# Patient Record
Sex: FEMALE | Race: OTHER | HISPANIC OR LATINO | Employment: FULL TIME | URBAN - METROPOLITAN AREA
[De-identification: names, ages, dates, MRNs, and addresses within clinical notes are randomized per-mention and may not be internally consistent; named-entity substitution may affect disease eponyms.]

---

## 2017-05-02 ENCOUNTER — OFFICE VISIT (OUTPATIENT)
Dept: PODIATRY | Facility: CLINIC | Age: 42
End: 2017-05-02
Payer: COMMERCIAL

## 2017-05-02 VITALS
SYSTOLIC BLOOD PRESSURE: 104 MMHG | HEIGHT: 66 IN | DIASTOLIC BLOOD PRESSURE: 61 MMHG | WEIGHT: 207 LBS | HEART RATE: 65 BPM | RESPIRATION RATE: 18 BRPM | BODY MASS INDEX: 33.27 KG/M2

## 2017-05-02 DIAGNOSIS — M79.672 BILATERAL FOOT PAIN: Primary | ICD-10-CM

## 2017-05-02 DIAGNOSIS — M79.671 BILATERAL FOOT PAIN: Primary | ICD-10-CM

## 2017-05-02 PROCEDURE — 1160F RVW MEDS BY RX/DR IN RCRD: CPT | Mod: S$GLB,,, | Performed by: PODIATRIST

## 2017-05-02 PROCEDURE — 99203 OFFICE O/P NEW LOW 30 MIN: CPT | Mod: S$GLB,,, | Performed by: PODIATRIST

## 2017-05-02 RX ORDER — VALACYCLOVIR HYDROCHLORIDE 500 MG/1
TABLET, FILM COATED ORAL
COMMUNITY
Start: 2017-02-08

## 2017-05-02 RX ORDER — LEVOTHYROXINE SODIUM 125 UG/1
TABLET ORAL
COMMUNITY
Start: 2017-04-24

## 2017-05-02 NOTE — PROGRESS NOTES
Subjective:    Patient ID: Jane Carrasquillo is a 42 y.o. female.    Chief Complaint: Toe Pain (bilateral feet, mos pain on left foot)      HPI:   Jane is a 42 y.o. female who presents to the podiatry clinic  with complaint of  bilateral foot pain. Onset of the symptoms was several years ago.  Pt had severe bunions b/l. The patient has numerous pxs perfomed by dr. Evelina maldonado. She had primary surgery in November(what appears to be L foot scarf+2nd weil) and December(what appears to be R foot base wedge) of 2016. Both operations were complicated by delayed healing, jessee the L foot. Eventually the patient developed postop pain that was thought to be a result of painful hardware. The patient was brought back to the OR for hardware removal from bunion surgery b/l. The patient reports after the TRUMAN operations, the patient still had continued pain and swelling. Her PCP ended up checking her vitamin D levels and they came back very low. She reports her deformity has recurred and she is still in pain L>R. Reports pain and swelling that keep her daily activities. Is not sure if her bone is healed yet. Here for evaluation.    HPI    Last Podiatry Enc: Visit date not found  Last Enc w/ Me: Visit date not found    Past Medical History:   Diagnosis Date    Asthma     childhood    Thyroid disease      Past Surgical History:   Procedure Laterality Date    BUNIONECTOMY Left      Social History     Social History    Marital status: Single     Spouse name: N/A    Number of children: N/A    Years of education: N/A     Occupational History    Not on file.     Social History Main Topics    Smoking status: Never Smoker    Smokeless tobacco: Not on file    Alcohol use Yes      Comment: occasional    Drug use: Not on file    Sexual activity: Not on file     Other Topics Concern    Not on file     Social History Narrative         Current Outpatient Prescriptions:     levothyroxine (SYNTHROID) 125 MCG tablet, , Disp: , Rfl:  "    oxycodone-acetaminophen (PERCOCET) 7.5-325 mg per tablet, Take 1 tablet by mouth every 4 (four) hours as needed for Pain., Disp: , Rfl:     valacyclovir (VALTREX) 500 MG tablet, , Disp: , Rfl:   Review of patient's allergies indicates:  No Known Allergies    ROS  ROS Constitutional:  General Appearance: well nourished  Vascular: negative for cramps, edema and bruising  Musculoskeletal: positive for joint pain, joint edema  Skin: negative for rashes and lesions  Neurological: negative for burning, tingling and numbness  Gastrointestinal: negative for stomach pain, nausea and vomiting        Objective:        /61 (BP Location: Right arm, Patient Position: Sitting, BP Method: Automatic)  Pulse 65  Resp 18  Ht 5' 6" (1.676 m)  Wt 93.9 kg (207 lb)  BMI 33.41 kg/m2    General    Nursing note and vitals reviewed.  Constitutional: She is oriented to person, place, and time. She appears well-developed.   Neurological: She is alert and oriented to person, place, and time.   Psychiatric: Judgment and thought content normal.         Right Ankle/Foot Exam     Swelling   The patient is swollen on the great toe metatarsophalangeal joint.    Tenderness   The patient is tender to palpation of the great toe metatarsophalangeal joint.    Pain   The patient exhibits pain of the great toe metatarsophalangeal joint.    Range of Motion   Ankle Joint   Dorsiflexion: abnormal   Plantar flexion: normal   Subtalar Joint   Inversion: normal   Eversion: normal   First MTP Joint: painful and limited    Muscle Strength   The patient has normal right ankle strength.    Other   Sensation: normal    Left Ankle/Foot Exam     Pain   The patient exhibits pain of the great toe metatarsophalangeal joint and lesser metatarsophalangeal joints.    Swelling   The patient is swollen on the great toe metatarsophalangeal joint and lesser metatarsophalangeal joints.    Tenderness   The patient is tender to palpation of the great toe " metatarsophalangeal joint and lesser metatarsophalangeal joints.    Range of Motion   Ankle Joint  Dorsiflexion: abnormal   Plantar flexion: normal     Subtalar Joint   Inversion: normal   Eversion: normal   First MTP Joint: painful and limited    Muscle Strength   The patient has normal left ankle strength.    Other   Sensation: normal      Vascular Exam     Right Pulses  Dorsalis Pedis:      2+  Posterior Tibial:      2+        Left Pulses  Dorsalis Pedis:      2+  Posterior Tibial:      2+          Physical Exam   Constitutional: She is oriented to person, place, and time. She appears well-developed.   Cardiovascular:   Pulses:       Dorsalis pedis pulses are 2+ on the right side, and 2+ on the left side.        Posterior tibial pulses are 2+ on the right side, and 2+ on the left side.   Neurological: She is alert and oriented to person, place, and time.   Psychiatric: Judgment and thought content normal.   Nursing note and vitals reviewed.    LE exam con't:  V: DP 2/4, PT 2/4, CRT< 3s to all digits tested.    N: SILT in SP/DP/T/Camron/Saph distributions.    Derm: Skin intact. No erythema, edema or ecchymosis.     Ortho:  +Motor EHL/FHL/TA/GA   B/l bunion with 2nd HT deformity present   Limited ROM at 1st mtpj b/l   +POP L sub 2nd mtpj   +POP b/l TMTJ   Compartments soft/compressible. No pain on passive stretch of big toe. No calf  pain.        Assessment:     Imaging / Labs:                                1. Bilateral foot pain          Plan:       Orders Placed This Encounter    X-Ray Foot Complete Bilateral    X-Ray Calcaneus Bilateral    X-Ray Ankle Complete Bilateral    Vitamin B12 Deficiency Panel     Procedures  .   Jane was seen today for toe pain.    Diagnoses and all orders for this visit:    Bilateral foot pain  -     X-Ray Foot Complete Bilateral; Future  -     X-Ray Calcaneus Bilateral; Future  -     X-Ray Ankle Complete Bilateral; Future  -     Vitamin B12 Deficiency Panel; Future        Jane G  Foreign is a 42 y.o. female presenting w/ 1. Bilateral foot pain        -pt seen, evaluated, and managed  -dx discussed in detail. All questions/concerns addressed  -all tx options discussed. All alternatives, risks, benefits of all txs discussed  -The patient was educated regarding the above diagnosis. We discussed conservative care options regarding shoe wear and/or padding.  -rxs dispensed: none  -xr on way out--> will review at nxt visit  -continue icing/stretching regimen  -offloading pads dispensed  -rec'd shoegear changes and OTC orthotics    Return in about 8 weeks (around 6/27/2017).

## 2017-05-02 NOTE — MR AVS SNAPSHOT
[facility]  [address], [address]   [place] [address]  Phone: [phone]  Fax: [phone]                  Jane Carrasquillo   2017 1:15 PM   Office Visit    Description:  Female : 1975   Provider:  Nelson Hill Jr., DPM   Department:  [facility]           Reason for Visit     Toe Pain           Diagnoses this Visit        Comments    Bilateral foot pain    -  Primary            To Do List           Future Appointments        Provider Department Dept Phone    2017 3:00 PM Nelson Hill Jr., DPM [facility] [phone]      Goals (5 Years of Data)     None      Follow-Up and Disposition     Return in about 8 weeks (around 2017).      [facility] On Call     [facility] On Call Nurse Care Line -  Assistance  Unless otherwise directed by your provider, please contact [facility] On-Call, our nurse care line that is available for  assistance.     Registered nurses in the [facility] provide: appointment scheduling, clinical advisement, health education, and other advisory services.  Call: [phone] (toll free)               Medications           STOP taking these medications     thyroid 30 mg Tab Take 30 mg by mouth once daily.    tramadol (ULTRAM) 50 mg tablet Take 50 mg by mouth every 6 (six) hours as needed for Pain.           Verify that the below list of medications is an accurate representation of the medications you are currently taking.  If none reported, the list may be blank. If incorrect, please contact your healthcare provider. Carry this list with you in case of emergency.           Current Medications     levothyroxine (SYNTHROID) 125 MCG tablet     oxycodone-acetaminophen (PERCOCET) 7.5-325 mg per tablet Take 1 tablet by mouth every 4 (four) hours as needed for Pain.    valacyclovir (VALTREX) 500 MG tablet            Clinical Reference Information           Your Vitals Were     BP Pulse Resp  "Height Weight BMI    104/61 (BP Location: Right arm, Patient Position: Sitting, BP Method: Automatic) 65 18 5' 6" (1.676 m) 93.9 kg (207 lb) 33.41 kg/m2      Blood Pressure          Most Recent Value    BP  104/61      Allergies as of 5/2/2017     No Known Allergies      Immunizations Administered on Date of Encounter - 5/2/2017     None      Orders Placed During Today's Visit     Future Labs/Procedures Expected by Expires    X-Ray Ankle Complete Bilateral  5/2/2017 5/2/2018    X-Ray Calcaneus Bilateral  5/2/2017 5/2/2018    X-Ray Foot Complete Bilateral  5/2/2017 5/2/2018      [facility] Sign-Up     Activating your [facility] account is as easy as 1-2-3!     1) Visit [email or web address], select Sign Up Now, enter this activation code and your date of birth, then select Next.  [email or web address]  Expires: 6/16/2017  2:19 PM      2) Create a username and password to use when you visit [facility] in the future and select a security question in case you lose your password and select Next.    3) Enter your e-mail address and click Sign Up!    Additional Information  If you have questions, please e-mail [email or web address] or call [phone] to talk to our [facility] staff. Remember, [facility] is NOT to be used for urgent needs. For medical emergencies, dial 911.         Language Assistance Services     ATTENTION: Language assistance services are available, free of charge. Please call [phone].      ATENCIÓN: Si habla español, tiene a coyne disposición servicios gratuitos de asistencia lingüística. Llame al [phone].     CHÚ Ý: N?u b?n nói Ti?ng Vi?t, có các d?ch v? h? tr? ngôn ng? mi?n phí dành cho b?n. G?i s? [phone].         [facility] complies with applicable Federal civil rights laws and does not discriminate on the basis of race, color, national origin, age, disability, or sex.        "

## 2017-06-27 ENCOUNTER — OFFICE VISIT (OUTPATIENT)
Dept: PODIATRY | Facility: CLINIC | Age: 42
End: 2017-06-27
Payer: COMMERCIAL

## 2017-06-27 VITALS
WEIGHT: 203 LBS | SYSTOLIC BLOOD PRESSURE: 103 MMHG | BODY MASS INDEX: 32.62 KG/M2 | HEART RATE: 59 BPM | HEIGHT: 66 IN | DIASTOLIC BLOOD PRESSURE: 62 MMHG

## 2017-06-27 DIAGNOSIS — M20.22 HALLUX RIGIDUS OF LEFT FOOT: Primary | ICD-10-CM

## 2017-06-27 PROCEDURE — 99213 OFFICE O/P EST LOW 20 MIN: CPT | Mod: S$GLB,,, | Performed by: PODIATRIST

## 2017-06-27 RX ORDER — OMEPRAZOLE 10 MG/1
10 CAPSULE, DELAYED RELEASE ORAL DAILY
COMMUNITY

## 2017-06-27 NOTE — PROGRESS NOTES
Subjective:    Patient ID: Jane Carrasquillo is a 42 y.o. female.    Chief Complaint: Follow-up      HPI:   Jane is a 42 y.o. female who presents to the podiatry clinic  with complaint of  bilateral foot pain. Onset of the symptoms was several years ago.  Pt had severe bunions b/l. The patient has numerous pxs perfomed by dr. Evelina maldonado. She had primary surgery in November(what appears to be L foot scarf+2nd weil) and December(what appears to be R foot base wedge) of 2016. Both operations were complicated by delayed healing, jessee the L foot. Eventually the patient developed postop pain that was thought to be a result of painful hardware. The patient was brought back to the OR for hardware removal from bunion surgery b/l. The patient reports after the TRUMAN operations, the patient still had continued pain and swelling. Her PCP ended up checking her vitamin D levels and they came back very low. She reports her deformity has recurred and she is still in pain L>R. Reports pain and swelling that keep her daily activities. Is not sure if her bone is healed yet. Here for evaluation.    HPI    Last Podiatry Enc: 5/2/2017  Last Enc w/ Me: 5/2/2017    Past Medical History:   Diagnosis Date    Asthma     childhood    Thyroid disease      Past Surgical History:   Procedure Laterality Date    BUNIONECTOMY Left      Social History     Social History    Marital status: Single     Spouse name: N/A    Number of children: N/A    Years of education: N/A     Occupational History    Not on file.     Social History Main Topics    Smoking status: Never Smoker    Smokeless tobacco: Not on file    Alcohol use Yes      Comment: occasional    Drug use: Unknown    Sexual activity: Not on file     Other Topics Concern    Not on file     Social History Narrative    No narrative on file         Current Outpatient Prescriptions:     levothyroxine (SYNTHROID) 125 MCG tablet, , Disp: , Rfl:     omeprazole (PRILOSEC) 10 MG capsule,  "Take 10 mg by mouth once daily., Disp: , Rfl:     oxycodone-acetaminophen (PERCOCET) 7.5-325 mg per tablet, Take 1 tablet by mouth every 4 (four) hours as needed for Pain., Disp: , Rfl:     valacyclovir (VALTREX) 500 MG tablet, , Disp: , Rfl:   Review of patient's allergies indicates:  No Known Allergies    ROS  ROS Constitutional:  General Appearance: well nourished  Vascular: negative for cramps, edema and bruising  Musculoskeletal: positive for joint pain, joint edema  Skin: negative for rashes and lesions  Neurological: negative for burning, tingling and numbness  Gastrointestinal: negative for stomach pain, nausea and vomiting      Objective:        /62   Pulse (!) 59   Ht 5' 6" (1.676 m)   Wt 92.1 kg (203 lb)   BMI 32.77 kg/m²     General    Nursing note and vitals reviewed.  Constitutional: She appears well-developed.   Psychiatric: She has a normal mood and affect. Her behavior is normal.         Left Ankle/Foot Exam     Pain   The patient exhibits pain of the great toe metatarsophalangeal joint and lesser metatarsophalangeal joints.    Swelling   The patient is swollen on the great toe metatarsophalangeal joint.    Tenderness   The patient is tender to palpation of the great toe metatarsophalangeal joint and lesser metatarsophalangeal joints.    Range of Motion   Ankle Joint  Dorsiflexion: abnormal   Plantar flexion: normal     Subtalar Joint   Inversion: normal   Eversion: normal   First MTP Joint: painful and limited    Other   Sensation: normal      Muscle Strength   Right Lower Extremity   EHL:  5/5  Left Lower Extremity   Anterior tibial:  5/5/5   Posterior tibial:  5/5/5  Gastrocsoleus:  5/5/5  Peroneal muscle:  5/5/5  FDL: 5/5  FHL: 2/5    Vascular Exam       Left Pulses  Dorsalis Pedis:      2+  Posterior Tibial:      2+          Physical Exam   Constitutional: She appears well-developed.   Cardiovascular:   Pulses:       Dorsalis pedis pulses are 2+ on the left side.        Posterior " tibial pulses are 2+ on the left side.   Psychiatric: She has a normal mood and affect. Her behavior is normal.   Nursing note and vitals reviewed.    LE exam con't:  V: DP 2/4, PT 2/4, CRT< 3s to all digits tested.    N: SILT in SP/DP/T/Camron/Saph distributions.    Derm: Skin intact. No erythema, edema or ecchymosis.     Ortho:  +Motor EHL/FHL/TA/GA   B/l bunion with 2nd HT deformity present b/l   Limited ROM and POP at 1st mtpj b/l (L>R), pain jessee sub MT head 1   +POP L sub 2nd mtpj b/l (L>R)   Compartments soft/compressible. No pain on passive stretch of big toe. No calf  pain.        Assessment:     Imaging / Labs:  Also reviewed previous XRs (in initial note)    Narrative     Foot complete 3 views bilateral.    Findings: 6 views. There is osseous spur at the Achilles insertion on the calcaneus bilaterally. There is a remote healed deformity of the mid to distal first metatarsal bilaterally. There is a single fixation screw identified within the distal second metatarsal on the left with no evidence of hardware failure. There is no acute fracture or dislocation.   Impression      As above.      Electronically signed by: CARLOS FELIZ MD  Date: 05/02/17  Time: 15:50            1. Hallux rigidus of left foot          Plan:       Orders Placed This Encounter    ORTHOTIC DEVICE (DME)     Procedures    Jane was seen today for follow-up.    Diagnoses and all orders for this visit:    Hallux rigidus of left foot  -     ORTHOTIC DEVICE (DME)        Jane Carrasquillo is a 42 y.o. female presenting w/   1. Hallux rigidus of left foot        -pt seen, evaluated, and managed  -dx discussed in detail. All questions/concerns addressed  -all tx options discussed. All alternatives, risks, benefits of all txs discussed  -The patient was educated regarding the above diagnosis. We discussed conservative care options regarding shoe wear and/or padding.  -rxs dispensed: none  -XR reviewed  -continue icing/stretching regimen  -rx for CMOs  dispensed  -we discussed revision surgery. Her main complaint in the L foot: limited ROM at great toe - pain mostly at end ROM plantarly. She also has pain sub 2nd mtpj with limited ROM. I believe her limited motion and dysfunctional 1st ray/medial column are causing her to WB improperly on her lesser MTPJs. While she does have some OA in the 1st mtpj, I do not think this is the main cause of her pain. Joint replacement and fusion are not options for her at this point due to her activity level and expectations, but may be options down the road. We discussed a revision bunionectomy (lapidus+akin) which would improve her alignment and correct her at the peak of her deformity, but may not resolve her symptoms.   -ultimately, we agreed that trying a CMO made with either a carbon fiber foot plate or mortons extension would be the best thing to try first    Pt to f/u prn    Return if symptoms worsen or fail to improve.

## 2017-06-27 NOTE — PATIENT INSTRUCTIONS
Hallux Rigidus        What Is Hallux Rigidus?    Normal function of the joint at the base of the big toeHallux rigidus is a disorder of the joint located at the base of the big toe. It causes pain and stiffness in the joint, and with time, it gets increasingly harder to bend the toe. Hallux refers to the big toe, while rigidus indicates that the toe is rigid and cannot move. Hallux rigidus is actually a form of degenerative arthritis.        This disorder can be very troubling and even disabling since we use the big toe whenever we walk, stoop down, climb up or even stand. Many patients confuse hallux rigidus with a bunion, which affects the same joint, but they are very different conditions requiring different treatment.        Because hallux rigidus is a progressive condition, the toes motion decreases as time goes on. In its earlier stage, when motion of the big toe is only somewhat limited, the condition is called hallux limitus. But as the problem advances, the toes range of motion gradually decreases until it potentially reaches the end stage of rigidus, in which the big toe becomes stiff or what is sometimes called a frozen joint.    Causes  Limited motion of the big toe caused by hallux rigidusCommon causes of hallux rigidus are faulty function (biomechanics) and structural abnormalities of the foot that can lead to osteoarthritis in the big toe joint. This type of arthritis--the kind that results from wear and tear--often develops in people who have defects that change the way their foot and big toe functions. For example, those with fallen arches or excessive pronation (rolling in) of the ankles are susceptible to developing hallux rigidus. In some people, hallux rigidus runs in the family and is a result of inheriting a foot type that is prone to developing this condition. In other cases, it is associated with overuse, especially among people engaged in activities or jobs that increase the stress on the  big toe, such as workers who often must stoop or squat. Hallux rigidus can also result from an injury, such as stubbing your toe. Or it may be caused by inflammatory diseases, such as rheumatoid arthritis or gout. Your foot and ankle surgeon can determine the cause of your hallux rigidus and recommend the best treatment.    Symptoms  Early signs and symptoms include:    Pain and stiffness in the big toe during use (walking, standing, bending, etc.)  Pain and stiffness aggravated by cold, damp weather  Difficulty with certain activities (running, squatting)  Swelling and inflammation around the joint    Rigid deformity of the big toe caused by Hallux Rigidus  As the disorder gets more serious, additional symptoms may develop, including:    Pain, even during rest  Difficulty wearing shoes because bone spurs (overgrowths) develop  Dull pain in the hip, knee or lower back due to changes in the way you walk  Limping (in severe cases)     Diagnosis  The sooner this condition is diagnosed, the easier it is to treat. Therefore, the best time to see a foot and ankle surgeon is when you first notice symptoms. If you wait until bone spurs develop, your condition is likely to be more difficult to manage.    In diagnosing hallux rigidus, the surgeon will examine your feet and move the toe to determine its range of motion. X-rays help determine how much arthritis is present as well as to evaluate any bone spurs or other abnormalities that may have formed.    Nonsurgical Treatment  In many cases, early treatment may prevent or postpone the need for surgery in the future. Treatment for mild or moderate cases of hallux rigidus may include:    -Shoe modifications. Shoes with a large toe box put less pressure on your toe. Stiff or rocker-bottom soles may also be recommended.  -Orthotic devices. Custom orthotic devices may improve foot function. Some examples include: Full-length prefabricated stiff insert, carbon foot Efrain velásquez  extension inlay, or rocker bottom sole  -Medications. Oral nonsteroidal anti-inflammatory drugs (NSAIDs), such as ibuprofen, may be recommended to reduce pain and inflammation.  -Injection therapy. Injections of corticosteroids may reduce inflammation and pain.  -Physical therapy. Ultrasound therapy or other physical therapy modalities may be undertaken to provide temporary relief.       When Is Surgery Needed?  In some cases, surgery is the only way to eliminate or reduce pain. Several types of surgery are available for treatment of hallux rigidus. In selecting the procedure or combination of procedures for your particular case, the foot and ankle surgeon will take into consideration the extent of your deformity based on the x-ray findings, your age, your activity level and other factors. The length of the recovery period will vary depending on the procedure or procedures performed.      What Is Arthritis in the Foot?  Degenerative arthritis is a condition that slowly wears away joints, the area where bones meet and move. In the beginning, you may notice that the affected joint seems stiff. It may even ache. As the joint lining (cartilage) breaks down, the bones rub against each other, causing pain and swelling. Over time, small pieces of rough or splintered bone (bone spurs) develop, and the joints range of motion becomes limited. But movement doesnt have to cause pain. The effects of arthritis can be reduced.    The big-toe joint  When arthritis affects your big toe, your foot hurts when it pushes off the ground. Arthritis often appears in the big-toe joint along with a bunion (a bony bump at the side of the joint) or a bone spur on top of the joint.    Other joints  When arthritis affects the rear or midfoot joints, you feel pain when you put weight on your foot. Arthritis may affect the joint where the ankle and foot meet. It may also affect other joints nearby.  Date Last Reviewed: 7/1/2016  © 1579-9705 [employer]. [address] [place] [address], [place] [address]. All rights reserved. This information is not intended as a substitute for professional medical care. Always follow your healthcare professional's instructions.        Treating Arthritis in the Foot  If your symptoms are mild, medications may be enough to reduce pain and swelling. For more severe arthritis, surgery may be needed to improve the condition of the joint.    Medicine  Your doctor may prescribe medicine--pills or injections--to limit pain and swelling. Ice, aspirin, acetaminophen, or ibuprofen may help relieve mild symptoms that occur after activity.  Surgery and bone trimming  To ease movement and reduce pain, your doctor may trim damaged bone. If arthritis is severe, the joint may be fused or removed. If the bone is not damaged too badly, your doctor may simply shave away bone spurs. Any excess bone growth related to a bunion may also be trimmed.  Fusing joints  If damage is more severe, your doctor may fuse the joint to prevent the bones from rubbing. Afterward, staples, plates, or screws may hold the bones in place so they heal properly. In some cases, the joint may be removed and replaced with an implant.  After surgery  During the early stages of recovery, your foot is likely to be bandaged and immobilized for a while. For best results, follow up with your doctor as scheduled. These visits help ensure that your foot heals properly.  As you heal  After surgery, youll be told how to care for your incision and how soon to begin walking on the foot. Until the foot can bear weight, you may need to walk with crutches or a cane.  For surgery on the big toe, your foot may be splinted to limit movement for several weeks. Despite this, you should be able to walk soon after surgery.  For surgery on rear or midfoot joints, you may need to wear a cast or surgical shoe. These joints are fairly large, so full recovery may take a few months. Once the  bone has healed, any staples, plates, or screws may be removed.  Date Last Reviewed: 7/1/2016 © 2000-2016 [employer]. [address] [place] [address], [place] [address]. All rights reserved. This information is not intended as a substitute for professional medical care. Always follow your healthcare professional's instructions.      Foot Surgery: Degenerative Joint Disease    Degenerative joint disease (arthritis) often happens in the joint of a big toe. This bone growth may cause pain and stiffness in the joint. Left untreated, arthritis can break down the cartilage and destroy the joint. Your treatment choices depend on how damaged your joint is. There are many nonsurgical treatments, but if these are not helpful, surgery may be considered.    Cheilectomy  This is done when the arthritic joint and cartilage can be saved. A bone spur caused by arthritis may be symptomatic on the top of the big toe joint. The procedure involves removing this bone spur, usually with a small part of the top of the joint itself.  You will need to wear a surgical shoe for several weeks. Once the foot heals, joint movement is restored.    Fusion  In fusion, the cartilage and some bone on both sides of the joint are removed. Then, the big toe and metatarsal bones are held together with staples, screws, or a plate and screws. Your foot may be placed in a cast. While you heal, you will be asked not to bear weight on this foot. You may also need crutches for several weeks. Because the joint has been removed, your toe will be less flexible.    Arthroplasty  During surgery, bone growth caused by the arthritis is trimmed, and part of the joint is removed. A pin can be used to align the bones and to keep them from touching. The pin is removed after several weeks. In some cases, the entire joint may be replaced with an implant. You may have to wear a splint or a surgical shoe for several weeks. When healed, the bones become connected with  scar tissue.  Date Last Reviewed: 10/15/2015  © 4648-3241 [employer]. [address] [place] [address], [place] [address]. All rights reserved. This information is not intended as a substitute for professional medical care. Always follow your healthcare professional's instructions.

## 2017-07-03 ENCOUNTER — TELEPHONE (OUTPATIENT)
Dept: PODIATRY | Facility: CLINIC | Age: 42
End: 2017-07-03

## 2017-07-03 NOTE — TELEPHONE ENCOUNTER
"----- Message from Andrew [place] sent at 7/3/2017 10:26 AM CDT -----  Contact: Ilsa Del Cid [phone]  "We are not a provider for UHC, [employer], BCBS, and medicaid"      "

## 2018-07-24 ENCOUNTER — OFFICE VISIT (OUTPATIENT)
Dept: OCCUPATIONAL MEDICINE | Facility: CLINIC | Age: 43
End: 2018-07-24
Payer: COMMERCIAL

## 2018-07-24 VITALS
SYSTOLIC BLOOD PRESSURE: 130 MMHG | BODY MASS INDEX: 35.32 KG/M2 | HEART RATE: 80 BPM | TEMPERATURE: 99 F | WEIGHT: 212 LBS | HEIGHT: 65 IN | DIASTOLIC BLOOD PRESSURE: 80 MMHG

## 2018-07-24 DIAGNOSIS — S13.9XXA CERVICAL SPRAIN, INITIAL ENCOUNTER: ICD-10-CM

## 2018-07-24 DIAGNOSIS — S43.402A SPRAIN OF LEFT SHOULDER, UNSPECIFIED SHOULDER SPRAIN TYPE, INITIAL ENCOUNTER: Primary | ICD-10-CM

## 2018-07-24 DIAGNOSIS — S53.402A SPRAIN OF LEFT ELBOW, INITIAL ENCOUNTER: ICD-10-CM

## 2018-07-24 PROCEDURE — 99204 OFFICE O/P NEW MOD 45 MIN: CPT | Mod: S$GLB,,, | Performed by: PREVENTIVE MEDICINE

## 2018-07-24 RX ORDER — ACETAMINOPHEN AND CODEINE PHOSPHATE 300; 30 MG/1; MG/1
1 TABLET ORAL
Qty: 30 TABLET | Refills: 0 | Status: SHIPPED | OUTPATIENT
Start: 2018-07-24 | End: 2020-07-08

## 2018-07-24 RX ORDER — ETODOLAC 400 MG/1
400 TABLET, FILM COATED ORAL 3 TIMES DAILY
Qty: 40 TABLET | Refills: 1 | Status: SHIPPED | OUTPATIENT
Start: 2018-07-24 | End: 2018-08-07 | Stop reason: SDUPTHER

## 2018-07-24 RX ORDER — ERGOCALCIFEROL 1.25 MG/1
50000 CAPSULE ORAL
COMMUNITY

## 2018-07-24 RX ORDER — MONTELUKAST SODIUM 10 MG/1
10 TABLET ORAL NIGHTLY
COMMUNITY

## 2018-07-24 RX ORDER — CETIRIZINE HYDROCHLORIDE 10 MG/1
10 TABLET ORAL DAILY
COMMUNITY
End: 2018-10-05 | Stop reason: ALTCHOICE

## 2018-07-24 NOTE — LETTER
[facility] [place]  [place] [address]  Phone: [phone]  Fax: [phone]    Pt Name: Jane Carrasquillo  Injury Date: 07/22/2018   Employee ID:  Date of First Treatment: 07/24/2018   Company: [employer]            Appointment Time: 02:50 PM Arrived:  3:05 PM CDT   Appointment Date: [unfilled] Time Out:1635 PM   Physician: Spike Salmon MD        Office Treatment: Jane was seen today for arm injury.    Diagnoses and all orders for this visit:  EXAM  XRAYS  PHYSICAL THERAPY ONCE APPROVED  LIGHT DUTY    Sprain of left shoulder, unspecified shoulder sprain type, initial encounter  -     etodolac (LODINE) 400 MG tablet; Take 1 tablet (400 mg total) by mouth 3 (three) times daily. Take medication with food  -     acetaminophen-codeine 300-30mg (TYLENOL #3) 300-30 mg Tab; Take 1 tablet by mouth every 4 to 6 hours as needed.  Sprain of left elbow, initial encounter  Cervical sprain, initial encounter     Patient Instructions: Daily home exercises/warm soaks, PT to be scheduled once authorized, Begin Physical Therapy    Restrictions: No lifting/pushing/pulling more than 10 lbs, Limited use of left hand and arm, No above the shoulder/overhead work       Return Appointment: 8/7/2018 at 1130 AM

## 2018-07-24 NOTE — PROGRESS NOTES
Subjective:       Patient ID: Jane Carrasquillo is a 43 y.o. female.    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  She doesn't recall prior injuries to areas and has been taking Ibuprofen for the pain which helps slightly.  No other treatment and has been working. Ambulatory. MJB      Arm Injury    The incident occurred 3 to 5 days ago. The incident occurred at work. The injury mechanism was repetitive motion and twisted. Pain location: Entire left arm. The quality of the pain is described as aching, burning and cramping. The pain radiates to the left arm. The pain is at a severity of 5/10. The pain is moderate. The pain has been constant since the incident. Pertinent negatives include no chest pain. The symptoms are aggravated by lifting and movement. She has tried NSAIDs for the symptoms. The treatment provided mild relief.     Review of Systems   Constitution: Negative for chills and fever.   HENT: Negative for sore throat.    Eyes: Negative for blurred vision.   Cardiovascular: Negative for chest pain.   Respiratory: Negative for shortness of breath.    Skin: Negative for rash.   Musculoskeletal: Positive for joint pain, muscle cramps, muscle weakness and stiffness. Negative for back pain.   Gastrointestinal: Negative for abdominal pain, diarrhea, nausea and vomiting.   Neurological: Negative for headaches.   Psychiatric/Behavioral: The patient is not nervous/anxious.        Objective:      Physical Exam   Constitutional: She appears well-developed and well-nourished.   HENT:   Head: Normocephalic and atraumatic.   Eyes: EOM are normal. Pupils are equal, round, and reactive to light.   Neck: Normal range of motion. Neck supple.   Cardiovascular: Normal rate.     Pulmonary/Chest: Effort normal.   Musculoskeletal:        Left shoulder: She exhibits decreased range of motion, tenderness, bony tenderness and pain. She exhibits no swelling, no effusion, no crepitus, no deformity, no laceration, no spasm, normal pulse and normal strength.        Left elbow: She exhibits decreased range of motion. She exhibits no swelling, no effusion, no deformity and no laceration. Tenderness found. Medial epicondyle, lateral epicondyle and olecranon process tenderness noted. No radial head tenderness noted.        Cervical back: She exhibits decreased range of motion, tenderness and pain. She exhibits no bony tenderness, no swelling, no edema, no deformity, no laceration, no spasm and normal pulse.        Lumbar back: She exhibits no bony tenderness, no swelling, no edema, no deformity, no laceration, no spasm and normal pulse.        Back:         Arms:  Pain with palpation of the left paracervical muscles. Pain with flexion of neck to 45 degrees, extension to 10 degrees, lateral rotation and bending to 25 degrees to the right and left. No motor or sensory deficits about the upper extremities.   Patient has pain with palpation of left elbow about the medial and lateral aspects as well as the olecranon of the left elbow. Pain with flexion, extension, supination and pronation of left elbow.   Pain about left shoulder anterior and superior aspects. Pain with flexion to 90 degrees, extension to 45 degrees, abduction to 90 degrees. Pain with internal and external rotation of left shoulder.    Neurological: She is alert.   No focal neurologic deficits   Skin: Skin is warm.   Psychiatric: She has a normal mood and affect.   Nursing note and vitals reviewed.      Assessment:       1. Sprain of left shoulder, unspecified shoulder sprain type, initial encounter    2. Sprain of left elbow, initial encounter    3. Cervical sprain, initial encounter        Plan:           Medications Ordered This  Encounter      acetaminophen-codeine 300-30mg (TYLENOL #3) 300-30 mg Tab          Sig: Take 1 tablet by mouth every 4 to 6 hours as needed.          Dispense:  30 tablet          Refill:  0      etodolac (LODINE) 400 MG tablet          Sig: Take 1 tablet (400 mg total) by mouth 3 (three) times daily. Take medication with food          Dispense:  40 tablet          Refill:  1  Patient Instructions: Daily home exercises/warm soaks, PT to be scheduled once authorized, Begin Physical Therapy   Restrictions: No lifting/pushing/pulling more than 10 lbs, Limited use of left hand and arm, No above the shoulder/overhead work  Follow-up in about 2 weeks (around 8/7/2018).

## 2018-07-31 ENCOUNTER — TELEPHONE (OUTPATIENT)
Dept: OCCUPATIONAL MEDICINE | Facility: CLINIC | Age: 43
End: 2018-07-31

## 2018-07-31 NOTE — TELEPHONE ENCOUNTER
Physical therapy begins at [facility] on 08/07/18 @ 1300 for left shoulder, left elbow,and neck.      CT

## 2018-08-07 ENCOUNTER — OFFICE VISIT (OUTPATIENT)
Dept: OCCUPATIONAL MEDICINE | Facility: CLINIC | Age: 43
End: 2018-08-07
Payer: COMMERCIAL

## 2018-08-07 DIAGNOSIS — M54.2 NECK PAIN: ICD-10-CM

## 2018-08-07 DIAGNOSIS — S13.9XXD NECK SPRAIN, SUBSEQUENT ENCOUNTER: ICD-10-CM

## 2018-08-07 DIAGNOSIS — M25.522 LEFT ELBOW PAIN: ICD-10-CM

## 2018-08-07 DIAGNOSIS — S53.402D SPRAIN OF LEFT ELBOW, SUBSEQUENT ENCOUNTER: ICD-10-CM

## 2018-08-07 DIAGNOSIS — S43.402D SPRAIN OF LEFT SHOULDER, UNSPECIFIED SHOULDER SPRAIN TYPE, SUBSEQUENT ENCOUNTER: Primary | ICD-10-CM

## 2018-08-07 DIAGNOSIS — M25.512 ACUTE PAIN OF LEFT SHOULDER: ICD-10-CM

## 2018-08-07 PROCEDURE — 99214 OFFICE O/P EST MOD 30 MIN: CPT | Mod: S$GLB,,, | Performed by: NURSE PRACTITIONER

## 2018-08-07 RX ORDER — ETODOLAC 400 MG/1
400 TABLET, FILM COATED ORAL 3 TIMES DAILY
Qty: 40 TABLET | Refills: 1 | Status: SHIPPED | OUTPATIENT
Start: 2018-08-07 | End: 2018-09-06 | Stop reason: SDUPTHER

## 2018-08-07 NOTE — LETTER
[facility] [place]  [place] [address]  Phone: [phone]  Fax: [phone]    Pt Name: Jane Carrasquillo  Injury Date: 07/22/2018   Employee ID:  Date of Treatment: 08/07/2018   Company: [employer]            Appointment Time: 11:15 AM Arrived: 1646 pM CDT   Appointment Date: [unfilled] Time Out:1730   Physician: Renée Calderon NP        Office Treatment: Jane was seen today for arm injury.    Diagnoses and all orders for this visit:  EXAM  LIGHT DUTY    Sprain of left shoulder, unspecified shoulder sprain type, subsequent encounter  Neck sprain, subsequent encounter  Sprain of left elbow, subsequent encounter  Sprain of left shoulder, unspecified shoulder sprain type, initial encounter  -     etodolac (LODINE) 400 MG tablet; Take 1 tablet (400 mg total) by mouth 3 (three) times daily. Take medication with food  Cervical sprain, initial encounter     Patient Instructions: Attention not to aggravate affected area, Daily home exercises/warm soaks, Continue Physical Therapy    Restrictions: No lifting/pushing/pulling more than 10 lbs, Limited use of left hand and arm, No above the shoulder/overhead work       Return Appointment: 8/21/2018 at 11:00 AM

## 2018-08-07 NOTE — PROGRESS NOTES
Subjective:       Patient ID: Jane Carrasquillo is a 43 y.o. female.    Chief Complaint: Arm Injury (LEFT)    Pt returned to the clinic today for a follow up visit for left shoulder pain. Pt states her injury has improved since her last office visit. IJ She has been to [facility] thus far with good results. 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 MWT      Arm Injury    The incident occurred more than 1 week ago. The incident occurred at work. The injury mechanism was repetitive motion and twisted. The pain is present in the left shoulder (Entire left arm). The quality of the pain is described as aching and burning. The pain radiates to the left arm. The pain is at a severity of 2/10. The pain is moderate. The pain has been improving since the incident. Associated symptoms include numbness. Pertinent negatives include no chest pain. She has tried NSAIDs for the symptoms. The treatment provided mild relief.     Review of Systems   Constitution: Negative for chills and fever.   HENT: Negative for sore throat.    Eyes: Negative for blurred vision.   Cardiovascular: Negative for chest pain.   Respiratory: Negative for shortness of breath.    Skin: Negative for rash.   Musculoskeletal: Positive for muscle weakness and stiffness. Negative for back pain, joint pain and muscle cramps.   Gastrointestinal: Negative for abdominal pain, diarrhea, nausea and vomiting.   Neurological: Positive for numbness. Negative for headaches.   Psychiatric/Behavioral: The patient is not nervous/anxious.        Objective:      Physical Exam   Constitutional: She is oriented to person, place, and time. She appears well-developed and well-nourished. No distress.   HENT:   Right Ear: External ear normal.   Left Ear: External ear normal.   Nose: Nose normal.   Eyes: Conjunctivae are normal.   Cardiovascular: Intact distal pulses.    Pulmonary/Chest: Effort  normal.   Musculoskeletal: She exhibits tenderness.        Left shoulder: She exhibits decreased range of motion, tenderness and pain. She exhibits normal pulse and normal strength.        Left elbow: She exhibits decreased range of motion. She exhibits no swelling and no deformity. Tenderness found. Medial epicondyle, lateral epicondyle and olecranon process tenderness noted. No radial head tenderness noted.        Cervical back: She exhibits decreased range of motion, tenderness and pain. She exhibits no swelling and normal pulse.   Pain to neck with ROM, especially right lateral rotation.   Pain to left shoulder with overhead reaching and adduction. Neg empty can test.  Pain to left elbow with palpation and ROM. Has physical therapy tape in place to left arm.   Neurological: She is alert and oriented to person, place, and time.   Skin: Skin is warm and dry. Capillary refill takes less than 2 seconds. No erythema.   Psychiatric: She has a normal mood and affect. Her behavior is normal.       Assessment:       1. Sprain of left shoulder, unspecified shoulder sprain type, subsequent encounter    2. Neck sprain, subsequent encounter    3. Sprain of left elbow, subsequent encounter    4. Acute pain of left shoulder    5. Left elbow pain    6. Neck pain        Plan:           Medications Ordered This Encounter      etodolac (LODINE) 400 MG tablet          Sig: Take 1 tablet (400 mg total) by mouth 3 (three) times daily. Take medication with food          Dispense:  40 tablet          Refill:  1  Patient Instructions: Attention not to aggravate affected area, Daily home exercises/warm soaks, Continue Physical Therapy   Restrictions: No lifting/pushing/pulling more than 10 lbs, Limited use of left hand and arm, No above the shoulder/overhead work  Follow-up in about 2 weeks (around 8/21/2018).

## 2018-08-21 ENCOUNTER — OFFICE VISIT (OUTPATIENT)
Dept: OCCUPATIONAL MEDICINE | Facility: CLINIC | Age: 43
End: 2018-08-21
Payer: COMMERCIAL

## 2018-08-21 DIAGNOSIS — S43.402D SPRAIN OF LEFT SHOULDER, UNSPECIFIED SHOULDER SPRAIN TYPE, SUBSEQUENT ENCOUNTER: Primary | ICD-10-CM

## 2018-08-21 DIAGNOSIS — S53.402D SPRAIN OF LEFT ELBOW, SUBSEQUENT ENCOUNTER: ICD-10-CM

## 2018-08-21 DIAGNOSIS — S13.9XXD NECK SPRAIN, SUBSEQUENT ENCOUNTER: ICD-10-CM

## 2018-08-21 DIAGNOSIS — M25.512 ACUTE PAIN OF LEFT SHOULDER: ICD-10-CM

## 2018-08-21 PROCEDURE — 99213 OFFICE O/P EST LOW 20 MIN: CPT | Mod: S$GLB,,, | Performed by: PREVENTIVE MEDICINE

## 2018-08-21 RX ORDER — ORPHENADRINE CITRATE 100 MG/1
100 TABLET, EXTENDED RELEASE ORAL 2 TIMES DAILY
Qty: 60 TABLET | Refills: 1 | Status: SHIPPED | OUTPATIENT
Start: 2018-08-21 | End: 2018-09-25 | Stop reason: SDUPTHER

## 2018-08-21 NOTE — PROGRESS NOTES
Subjective:       Patient ID: Jane Carrasquillo is a 43 y.o. female.    Chief Complaint: Shoulder Pain    Pt returned to the clinic today for a follow up visit for left shoulder pain. Pt states her injury has improved since her last office visit. IJ She has been to 3 P.T. Sessions thus far with good results. 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 MWT      Shoulder Pain    Associated symptoms include numbness and stiffness. Pertinent negatives include no fever or headaches.   Arm Injury    The incident occurred more than 1 week ago. The incident occurred at work. The injury mechanism was repetitive motion and twisted. The pain is present in the left shoulder (Entire left arm). The quality of the pain is described as aching and burning. The pain radiates to the left arm. The pain is at a severity of 2/10. The pain is moderate. The pain has been improving since the incident. Associated symptoms include numbness. Pertinent negatives include no chest pain. She has tried NSAIDs for the symptoms. The treatment provided mild relief.     Review of Systems   Constitution: Negative for chills and fever.   HENT: Negative for sore throat.    Eyes: Negative for blurred vision.   Cardiovascular: Negative for chest pain.   Respiratory: Negative for shortness of breath.    Skin: Negative for rash.   Musculoskeletal: Positive for muscle weakness and stiffness. Negative for back pain, joint pain and muscle cramps.   Gastrointestinal: Negative for abdominal pain, diarrhea, nausea and vomiting.   Neurological: Positive for numbness. Negative for headaches.   Psychiatric/Behavioral: The patient is not nervous/anxious.        Objective:      Physical Exam   Constitutional: She appears well-developed and well-nourished.   HENT:   Head: Normocephalic.   Eyes: Pupils are equal, round, and reactive to light.   Neck: Normal range of motion.   Cardiovascular:  Normal rate.   Pulmonary/Chest: Effort normal.   Musculoskeletal:        Left shoulder: She exhibits decreased range of motion, tenderness and pain. She exhibits no bony tenderness, no swelling, no effusion, no crepitus, no deformity, no laceration, no spasm, normal pulse and normal strength.        Cervical back: She exhibits decreased range of motion, tenderness and pain. She exhibits no bony tenderness, no swelling, no edema, no deformity, no laceration and no spasm.        Lumbar back: She exhibits no bony tenderness, no swelling, no edema, no deformity, no laceration, no spasm and normal pulse.        Back:         Arms:  Pain about left side of neck with palpation and with all range of motion. Pain with flexion of neck to 25 degrees, extension to 10 degrees and lateral rotation to 25 degrees.   Neurological: She is alert.   No focal neurologic deficits   Skin: Skin is warm.   Psychiatric: She has a normal mood and affect.   Nursing note and vitals reviewed.      Assessment:       1. Sprain of left shoulder, unspecified shoulder sprain type, subsequent encounter    2. Neck sprain, subsequent encounter    3. Sprain of left elbow, subsequent encounter    4. Acute pain of left shoulder        Plan:         Medications Ordered This Encounter   Medications    orphenadrine (NORFLEX) 100 mg tablet     Sig: Take 1 tablet (100 mg total) by mouth 2 (two) times daily.     Dispense:  60 tablet     Refill:  1     Patient Instructions: Daily home exercises/warm soaks, Continue Physical Therapy(Continue Etodolac 400mg TID with food)   Restrictions: No lifting/pushing/pulling more than 25 lbs, No above the shoulder/overhead work  Follow-up in about 1 week (around 8/28/2018).

## 2018-08-21 NOTE — LETTER
[facility] - [place] [address] [address]  Phone: [phone]  Fax: [phone]    Pt Name: Jane Carrasquillo  Injury Date: 07/22/2018   Employee ID:  Date of Treatment: 08/21/2018   Company: [employer]            Appointment Time: 10:45 AM Arrived: 11:00 AM CDT   Appointment Date: 08/21/2018 Time Out: 10:40 AM   Physician: Spike Salmon MD        Office Treatment: Jane was seen today for shoulder pain.    Diagnoses and all orders for this visit:  EXAM   RX GIVEN  CONTINUE PHYSICAL THERAPY   LIGHT DUTY   FOLLOW UP IN ONE WEEK      Sprain of left shoulder, unspecified shoulder sprain type, subsequent encounter    Neck sprain, subsequent encounter    Sprain of left elbow, subsequent encounter    Acute pain of left shoulder     Patient Instructions: Daily home exercises/warm soaks, Continue Physical Therapy(Continue Etodolac 400mg TID with food)    Restrictions: No lifting/pushing/pulling more than 25 lbs, No above the shoulder/overhead work       Return Appointment: 8/28/2018 @ 10:30 AM

## 2018-08-28 ENCOUNTER — OFFICE VISIT (OUTPATIENT)
Dept: OCCUPATIONAL MEDICINE | Facility: CLINIC | Age: 43
End: 2018-08-28
Payer: COMMERCIAL

## 2018-08-28 DIAGNOSIS — S43.402D SPRAIN OF LEFT SHOULDER, UNSPECIFIED SHOULDER SPRAIN TYPE, SUBSEQUENT ENCOUNTER: Primary | ICD-10-CM

## 2018-08-28 DIAGNOSIS — S13.9XXD NECK SPRAIN, SUBSEQUENT ENCOUNTER: ICD-10-CM

## 2018-08-28 DIAGNOSIS — M25.512 ACUTE PAIN OF LEFT SHOULDER: ICD-10-CM

## 2018-08-28 DIAGNOSIS — S53.402D SPRAIN OF LEFT ELBOW, SUBSEQUENT ENCOUNTER: ICD-10-CM

## 2018-08-28 PROCEDURE — 99213 OFFICE O/P EST LOW 20 MIN: CPT | Mod: S$GLB,,, | Performed by: PREVENTIVE MEDICINE

## 2018-08-28 NOTE — PROGRESS NOTES
Subjective:       Patient ID: Jane Carrasquillo is a 43 y.o. female.    Chief Complaint: Arm Injury (LEFT 7/22/18)    Pt returned to the clinic today for a follow up visit for left arm pain. Pt states her injury has improved since her last office visit. IJ      Arm Injury    The incident occurred more than 1 week ago. The incident occurred at work. Quality: SORE. The pain does not radiate. The pain is at a severity of 1/10. The patient is experiencing no pain. The pain has been improving since the incident. Pertinent negatives include no chest pain or numbness. Nothing aggravates the symptoms. She has tried NSAIDs for the symptoms. The treatment provided mild relief.     Review of Systems   Constitution: Negative for chills and fever.   HENT: Negative for sore throat.    Eyes: Negative for blurred vision.   Cardiovascular: Negative for chest pain.   Respiratory: Negative for shortness of breath.    Skin: Negative for rash.   Musculoskeletal: Positive for stiffness. Negative for back pain, joint pain, muscle cramps and muscle weakness.   Gastrointestinal: Negative for abdominal pain, diarrhea, nausea and vomiting.   Neurological: Negative for headaches and numbness.   Psychiatric/Behavioral: The patient is not nervous/anxious.        Objective:      Physical Exam   Constitutional: She appears well-developed and well-nourished.   HENT:   Head: Normocephalic.   Eyes: Pupils are equal, round, and reactive to light.   Neck: Normal range of motion.   Cardiovascular: Normal rate.   Pulmonary/Chest: Effort normal.   Musculoskeletal:        Left shoulder: She exhibits decreased range of motion, tenderness and pain. She exhibits no bony tenderness, no swelling, no effusion, no crepitus, no deformity, no laceration, no spasm, normal pulse and normal strength.        Cervical back: She exhibits decreased range of motion, tenderness and pain. She exhibits no bony tenderness, no swelling, no edema, no deformity, no laceration and  no spasm.        Lumbar back: She exhibits no bony tenderness, no swelling, no edema, no deformity, no laceration, no spasm and normal pulse.        Back:         Arms:  Pain about left side of neck is unchanged with palpation and with all range of motion. Pain with flexion of neck to 25 degrees, extension to 10 degrees and lateral rotation to 25 degrees. Pain persists with internal and external rotation of left shoulder.   Neurological: She is alert.   No focal neurologic deficits   Skin: Skin is warm.   Psychiatric: She has a normal mood and affect.   Nursing note and vitals reviewed.      Assessment:       1. Sprain of left shoulder, unspecified shoulder sprain type, subsequent encounter    2. Neck sprain, subsequent encounter    3. Sprain of left elbow, subsequent encounter    4. Acute pain of left shoulder        Plan:            Patient Instructions: Daily home exercises/warm soaks(Continue Etodolac TID and Orphenadrine during the day.)   Restrictions: No lifting/pushing/pulling more than 25 lbs, No above the shoulder/overhead work  Follow-up in about 1 week (around 9/4/2018).

## 2018-08-28 NOTE — LETTER
[facility]  [place] [address]  [place] [address]  Phone: [phone]  Fax: [phone]    Pt Name: Jane Carrasquillo  Injury Date: 07/22/2018   Employee ID:  Date of Treatment: 08/28/2018   Company: [employer]            Appointment Time: 03:15 PM Arrived:  3:15 PM CDT   Appointment Date: [unfilled] Time Out:1545 PM   Physician: Spike Salmon MD        Office Treatment: Jane was seen today for arm injury.    Diagnoses and all orders for this visit:  EXAM  LIGHT DUTY    Sprain of left shoulder, unspecified shoulder sprain type, subsequent encounter  Neck sprain, subsequent encounter  Sprain of left elbow, subsequent encounter  Acute pain of left shoulder     Patient Instructions: Daily home exercises/warm soaks(Continue Etodolac TID and Orphenadrine during the day.)    Restrictions: No lifting/pushing/pulling more than 25 lbs, No above the shoulder/overhead work       Return Appointment: 9/4/2018 at 1515 PM

## 2018-09-04 ENCOUNTER — OFFICE VISIT (OUTPATIENT)
Dept: OCCUPATIONAL MEDICINE | Facility: CLINIC | Age: 43
End: 2018-09-04
Payer: COMMERCIAL

## 2018-09-04 DIAGNOSIS — S43.402D SPRAIN OF LEFT SHOULDER, UNSPECIFIED SHOULDER SPRAIN TYPE, SUBSEQUENT ENCOUNTER: Primary | ICD-10-CM

## 2018-09-04 DIAGNOSIS — S13.9XXD NECK SPRAIN, SUBSEQUENT ENCOUNTER: ICD-10-CM

## 2018-09-04 DIAGNOSIS — S53.402D SPRAIN OF LEFT ELBOW, SUBSEQUENT ENCOUNTER: ICD-10-CM

## 2018-09-04 PROCEDURE — 99213 OFFICE O/P EST LOW 20 MIN: CPT | Mod: S$GLB,,, | Performed by: PREVENTIVE MEDICINE

## 2018-09-04 NOTE — LETTER
[facility] - [place] [address]  Phone: [phone]  Fax: [phone]    Pt Name: Jane Carrasquillo  Injury Date: 07/22/2018   Employee ID:  Date of Treatment: 09/04/2018   Company: [employer]            Appointment Time: 03:00 PM Arrived:  3:15 PM CDT   Appointment Date: [unfilled] Time Out:1540 PM   Physician: Spike Salmon MD        Office Treatment: Jane was seen today for arm injury.    Diagnoses and all orders for this visit:  EXAM  LIGHT DUTY    Sprain of left shoulder, unspecified shoulder sprain type, subsequent encounter  Neck sprain, subsequent encounter  Sprain of left elbow, subsequent encounter     Patient Instructions: Attention not to aggravate affected area, Daily home exercises/warm soaks, Continue Physical Therapy    Restrictions: No above the shoulder/overhead work, No lifting/pushing/pulling more than 25 lbs       Return Appointment: 9/6/2018 at 1445 PM

## 2018-09-04 NOTE — PROGRESS NOTES
Subjective:       Patient ID: Jane Carrasquillo is a 43 y.o. female.    Chief Complaint: Arm Injury (left)    Pt returned to the clinic today for a follow up visit for left arm pain. Pt states her injury has improved since her last office visit and she is no longer in any pain. IJ      Arm Injury    The incident occurred more than 1 week ago. The incident occurred at work. Pain location: left arm. The pain does not radiate. The pain is at a severity of 0/10. The patient is experiencing no pain. The pain has been improving since the incident. Pertinent negatives include no chest pain or numbness. Nothing aggravates the symptoms. She has tried NSAIDs for the symptoms. The treatment provided mild relief.     Review of Systems   Constitution: Negative for chills and fever.   HENT: Negative for sore throat.    Eyes: Negative for blurred vision.   Cardiovascular: Negative for chest pain.   Respiratory: Negative for shortness of breath.    Skin: Negative for rash.   Musculoskeletal: Negative for back pain, joint pain, muscle cramps, muscle weakness and stiffness.   Gastrointestinal: Negative for abdominal pain, diarrhea, nausea and vomiting.   Neurological: Negative for headaches and numbness.   Psychiatric/Behavioral: The patient is not nervous/anxious.        Objective:      Physical Exam   Constitutional: She is oriented to person, place, and time. She appears well-developed and well-nourished. No distress.   HENT:   Right Ear: External ear normal.   Left Ear: External ear normal.   Nose: Nose normal.   Eyes: Conjunctivae are normal.   Neck: Normal range of motion.   Cardiovascular: Intact distal pulses.   Pulmonary/Chest: Effort normal.   Musculoskeletal:        Left shoulder: She exhibits normal range of motion, no tenderness, no pain and normal pulse.        Arms:  FROM to neck and left shoulder without pain. No arm or elbow pain. Neg empty can test.   Neurological: She is alert and oriented to person, place, and  time.   Skin: Skin is warm and dry.   Psychiatric: She has a normal mood and affect. Her behavior is normal.       Assessment:       1. Sprain of left shoulder, unspecified shoulder sprain type, subsequent encounter    2. Neck sprain, subsequent encounter    3. Sprain of left elbow, subsequent encounter        Plan:     Dr Salmon discussed with pt to increase weights with physical therapy in anticipation of returning to work. Will reassess Thursday.       Patient Instructions: Attention not to aggravate affected area, Daily home exercises/warm soaks, Continue Physical Therapy   Restrictions: No above the shoulder/overhead work, No lifting/pushing/pulling more than 25 lbs  Follow-up in about 2 days (around 9/6/2018).

## 2018-09-06 ENCOUNTER — OFFICE VISIT (OUTPATIENT)
Dept: OCCUPATIONAL MEDICINE | Facility: CLINIC | Age: 43
End: 2018-09-06
Payer: COMMERCIAL

## 2018-09-06 DIAGNOSIS — S13.9XXD NECK SPRAIN, SUBSEQUENT ENCOUNTER: ICD-10-CM

## 2018-09-06 DIAGNOSIS — S53.402D SPRAIN OF LEFT ELBOW, SUBSEQUENT ENCOUNTER: ICD-10-CM

## 2018-09-06 DIAGNOSIS — M25.512 ACUTE PAIN OF LEFT SHOULDER: ICD-10-CM

## 2018-09-06 DIAGNOSIS — S43.402D SPRAIN OF LEFT SHOULDER, UNSPECIFIED SHOULDER SPRAIN TYPE, SUBSEQUENT ENCOUNTER: Primary | ICD-10-CM

## 2018-09-06 DIAGNOSIS — M25.522 LEFT ELBOW PAIN: ICD-10-CM

## 2018-09-06 PROCEDURE — 99213 OFFICE O/P EST LOW 20 MIN: CPT | Mod: S$GLB,,, | Performed by: PREVENTIVE MEDICINE

## 2018-09-06 RX ORDER — ETODOLAC 400 MG/1
400 TABLET, FILM COATED ORAL 3 TIMES DAILY
Qty: 40 TABLET | Refills: 1 | Status: SHIPPED | OUTPATIENT
Start: 2018-09-06 | End: 2018-09-25 | Stop reason: SDUPTHER

## 2018-09-06 NOTE — PROGRESS NOTES
Subjective:       Patient ID: Jane Carrasquillo is a 43 y.o. female.    Chief Complaint: Arm Injury (LEFT)    Pt returned to the clinic today for a follow up visit for left arm pain. Pt states her injury has improved since her last office visit and she is no longer in any pain. IJ      Arm Injury    The incident occurred more than 1 week ago. The incident occurred at work. Pain location: left arm. The pain does not radiate. The pain is at a severity of 0/10. The patient is experiencing no pain. The pain has been improving since the incident. Pertinent negatives include no chest pain or numbness. Nothing aggravates the symptoms. She has tried NSAIDs for the symptoms. The treatment provided mild relief.     Review of Systems   Constitution: Negative for chills and fever.   HENT: Negative for sore throat.    Eyes: Negative for blurred vision.   Cardiovascular: Negative for chest pain.   Respiratory: Negative for shortness of breath.    Skin: Negative for rash.   Musculoskeletal: Negative for back pain, joint pain, muscle cramps, muscle weakness and stiffness.   Gastrointestinal: Negative for abdominal pain, diarrhea, nausea and vomiting.   Neurological: Negative for headaches and numbness.   Psychiatric/Behavioral: The patient is not nervous/anxious.        Objective:      Physical Exam   Constitutional: She appears well-developed and well-nourished.   HENT:   Head: Normocephalic.   Eyes: Pupils are equal, round, and reactive to light.   Neck: Normal range of motion.   Cardiovascular: Normal rate.   Pulmonary/Chest: Effort normal.   Musculoskeletal:        Left shoulder: She exhibits decreased range of motion and tenderness. She exhibits no bony tenderness, no swelling, no effusion, no crepitus, no deformity, no laceration, no pain, no spasm, normal pulse and normal strength.        Lumbar back: She exhibits no bony tenderness, no swelling, no edema, no deformity, no laceration, no spasm and normal pulse.         Arms:  Range of motion of left shoulder much improved with strength testing. No deficits with flexion, extension and abduction of left shoulder. Pulses good.    Neurological: She is alert.   No focal neurologic deficits   Skin: Skin is warm.   Psychiatric: She has a normal mood and affect.   Nursing note and vitals reviewed.      Assessment:       1. Sprain of left shoulder, unspecified shoulder sprain type, subsequent encounter    2. Neck sprain, subsequent encounter    3. Sprain of left elbow, subsequent encounter    4. Acute pain of left shoulder    5. Left elbow pain        Plan:         Medications Ordered This Encounter   Medications    etodolac (LODINE) 400 MG tablet     Sig: Take 1 tablet (400 mg total) by mouth 3 (three) times daily. Take medication with food     Dispense:  40 tablet     Refill:  1     Patient Instructions: Daily home exercises/warm soaks, Continue Physical Therapy   Restrictions: Regular Duty(May resume regular work on 9/10/18.)  Follow-up in about 2 weeks (around 9/20/2018).

## 2018-09-06 NOTE — LETTER
[facility] - [place] [address]  [place] [address]  Phone: [phone]  Fax: [phone]    Pt Name: Jane Carrasquillo  Injury Date: 07/22/2018   Employee ID:  Date of Treatment: 09/06/2018   Company:[employer]            Appointment Time: 02:30 PM Arrived:  2:45 PM CDT   Appointment Date: 09/06/2018 Time Out:5:10 PM   Physician: Spike Salmon MD        Office Treatment: Jane was seen today for arm injury.    Diagnoses and all orders for this visit:  EXAM  RX GIVEN  LIGHT DUTY THROUGH THE 9TH  REGULAR DUTY 9/10/2018    Sprain of left shoulder, unspecified shoulder sprain type, subsequent encounter    Neck sprain, subsequent encounter    Sprain of left elbow, subsequent encounter    Acute pain of left shoulder  -     etodolac (LODINE) 400 MG tablet; Take 1 tablet (400 mg total) by mouth 3 (three) times daily. Take medication with food    Left elbow pain     Patient Instructions: Daily home exercises/warm soaks, Continue Physical Therapy    Restrictions: Regular Duty(May resume regular work on 9/10/18.)       Return Appointment: 09/20/2018 @10:00 AM

## 2018-09-06 NOTE — LETTER
[facility] - [place] [address]  [place] [address]  Phone: [phone]  Fax: [phone]    Pt Name: Jane Carrasquillo  Injury Date: 07/22/2018   Employee ID:  Date of Treatment: 09/06/2018   Company: [employer]            Appointment Time: 02:30 PM Arrived:  2:45 PM CDT   Appointment Date: 09/06/2018 Time Out: 5:00 PM   Physician: Spike Salmon MD        Office Treatment: Jane was seen today for arm injury.    Diagnoses and all orders for this visit:  EXAM   RX GIVEN  LIGHT DUTY NOW  REGULAR DUTY START 9/10/2018    Sprain of left shoulder, unspecified shoulder sprain type, subsequent encounter    Neck sprain, subsequent encounter    Sprain of left elbow, subsequent encounter    Acute pain of left shoulder  -     etodolac (LODINE) 400 MG tablet; Take 1 tablet (400 mg total) by mouth 3 (three) times daily. Take medication with food    Left elbow pain     Patient Instructions: Daily home exercises/warm soaks, Continue Physical Therapy    Restrictions: Regular Duty(May resume regular work on 9/10/18.)       Return Appointment:  09/20/2018 @ 10:00 AM

## 2018-09-25 ENCOUNTER — OFFICE VISIT (OUTPATIENT)
Dept: URGENT CARE | Facility: CLINIC | Age: 43
End: 2018-09-25
Payer: COMMERCIAL

## 2018-09-25 DIAGNOSIS — S53.402D SPRAIN OF LEFT ELBOW, SUBSEQUENT ENCOUNTER: ICD-10-CM

## 2018-09-25 DIAGNOSIS — M25.512 ACUTE PAIN OF LEFT SHOULDER: ICD-10-CM

## 2018-09-25 DIAGNOSIS — S13.9XXD NECK SPRAIN, SUBSEQUENT ENCOUNTER: ICD-10-CM

## 2018-09-25 DIAGNOSIS — M25.522 LEFT ELBOW PAIN: ICD-10-CM

## 2018-09-25 DIAGNOSIS — S43.402D SPRAIN OF LEFT SHOULDER, UNSPECIFIED SHOULDER SPRAIN TYPE, SUBSEQUENT ENCOUNTER: Primary | ICD-10-CM

## 2018-09-25 PROCEDURE — 99213 OFFICE O/P EST LOW 20 MIN: CPT | Mod: S$GLB,,, | Performed by: PREVENTIVE MEDICINE

## 2018-09-25 RX ORDER — ORPHENADRINE CITRATE 100 MG/1
100 TABLET, EXTENDED RELEASE ORAL 2 TIMES DAILY
Qty: 60 TABLET | Refills: 1 | Status: SHIPPED | OUTPATIENT
Start: 2018-09-25 | End: 2019-09-25

## 2018-09-25 RX ORDER — ETODOLAC 400 MG/1
400 TABLET, FILM COATED ORAL 3 TIMES DAILY
Qty: 40 TABLET | Refills: 1 | Status: SHIPPED | OUTPATIENT
Start: 2018-09-25 | End: 2019-03-07 | Stop reason: ALTCHOICE

## 2018-09-25 NOTE — PROGRESS NOTES
Subjective:       Patient ID: Jane Carrasquillo is a 43 y.o. female.    Chief Complaint: Cold Extremity (left)    Patient follows up for left arm pain from 7/22/18. Patient states Pain meds help but she has to take them more often to relieve the pain. Without meds 7/10 after meds 0/10.  Daily activities increase her pain and pain has moved to the left elbow and neck with her left side always feeling hot. Physical Therapy completed and patient stated it helped a lot. Ambulatory. MJB       Arm Injury    The incident occurred more than 1 week ago. The incident occurred at work. The pain is present in the upper left arm (neck and left elbow). The quality of the pain is described as aching, burning and shooting. Radiates to: neck and left elbow. The pain is at a severity of 0/10. The patient is experiencing no pain. Pertinent negatives include no chest pain. The symptoms are aggravated by movement. She has tried acetaminophen and rest for the symptoms. The treatment provided moderate relief.     Review of Systems   Constitution: Negative for chills and fever.   HENT: Negative for sore throat.    Eyes: Negative for blurred vision.   Cardiovascular: Negative for chest pain.   Respiratory: Negative for shortness of breath.    Skin: Negative for rash.   Musculoskeletal: Positive for joint swelling, muscle cramps and stiffness. Negative for back pain and joint pain.   Gastrointestinal: Negative for abdominal pain, diarrhea, nausea and vomiting.   Neurological: Negative for headaches.   Psychiatric/Behavioral: The patient is not nervous/anxious.        Objective:      Physical Exam   Constitutional: She appears well-developed and well-nourished.   HENT:   Head: Normocephalic.   Eyes: Pupils are equal, round, and reactive to light.   Neck: Normal range of motion.   Cardiovascular: Normal rate.   Pulmonary/Chest: Effort normal.   Musculoskeletal:        Left shoulder: She exhibits decreased range of motion and tenderness. She  exhibits no bony tenderness, no swelling, no effusion, no crepitus, no deformity, no laceration, no pain, no spasm, normal pulse and normal strength.        Lumbar back: She exhibits no bony tenderness, no swelling, no edema, no deformity, no laceration, no spasm and normal pulse.        Arms:  Range of motion of left shoulder much improved with strength testing. No deficits with flexion, extension and abduction of left shoulder. Pulses good.    Neurological: She is alert.   No focal neurologic deficits   Skin: Skin is warm.   Psychiatric: She has a normal mood and affect.   Nursing note and vitals reviewed.      Assessment:       1. Sprain of left shoulder, unspecified shoulder sprain type, subsequent encounter    2. Neck sprain, subsequent encounter    3. Sprain of left elbow, subsequent encounter    4. Acute pain of left shoulder    5. Left elbow pain        Plan:         Medications Ordered This Encounter   Medications    etodolac (LODINE) 400 MG tablet     Sig: Take 1 tablet (400 mg total) by mouth 3 (three) times daily. Take medication with food     Dispense:  40 tablet     Refill:  1    orphenadrine (NORFLEX) 100 mg tablet     Sig: Take 1 tablet (100 mg total) by mouth 2 (two) times daily.     Dispense:  60 tablet     Refill:  1     Patient Instructions: Daily home exercises/warm soaks, Continue Physical Therapy(May need orthopedic referral for possible injection if not improved on next office visit.)   Restrictions: Regular Duty  Follow-up in about 3 weeks (around 10/16/2018).

## 2018-09-25 NOTE — LETTER
[facility] [place] [address]  [place] [address]  Phone: [phone]  Fax: [phone]    Pt Name: Jane Matthews Date: 07/22/2018   Employee ID:  Date of Treatment: 09/25/2018   Company: [employer]      Appointment Time: 12:45 PM Arrived: 1043 AM   Provider: Spike Salmon MD Time Out:1215 PM     Office Treatment:   EXAM  ADDITIONAL PT ONCE APPROVED  REGULAR DUTY    1. Sprain of left shoulder, unspecified shoulder sprain type, subsequent encounter    2. Neck sprain, subsequent encounter    3. Sprain of left elbow, subsequent encounter    4. Acute pain of left shoulder    5. Left elbow pain      Medications Ordered This Encounter   Medications    etodolac (LODINE) 400 MG tablet    orphenadrine (NORFLEX) 100 mg tablet      Patient Instructions: Daily home exercises/warm soaks, Continue Physical Therapy(May need orthopedic referral for possible injection if not improved on next office visit.)    Restrictions: Regular Duty     Return Appointment: 10/16/2018 at 0915 AM

## 2018-10-01 ENCOUNTER — TELEPHONE (OUTPATIENT)
Dept: URGENT CARE | Facility: CLINIC | Age: 43
End: 2018-10-01

## 2018-10-05 ENCOUNTER — OFFICE VISIT (OUTPATIENT)
Dept: URGENT CARE | Facility: CLINIC | Age: 43
End: 2018-10-05
Payer: COMMERCIAL

## 2018-10-05 VITALS
HEIGHT: 65 IN | OXYGEN SATURATION: 99 % | DIASTOLIC BLOOD PRESSURE: 79 MMHG | RESPIRATION RATE: 18 BRPM | BODY MASS INDEX: 34.16 KG/M2 | SYSTOLIC BLOOD PRESSURE: 137 MMHG | WEIGHT: 205 LBS

## 2018-10-05 DIAGNOSIS — J30.2 SEASONAL ALLERGIC RHINITIS, UNSPECIFIED TRIGGER: Primary | ICD-10-CM

## 2018-10-05 PROCEDURE — 96372 THER/PROPH/DIAG INJ SC/IM: CPT | Mod: S$GLB,,, | Performed by: NURSE PRACTITIONER

## 2018-10-05 PROCEDURE — 99213 OFFICE O/P EST LOW 20 MIN: CPT | Mod: 25,S$GLB,, | Performed by: NURSE PRACTITIONER

## 2018-10-05 RX ORDER — FLUTICASONE PROPIONATE 50 MCG
2 SPRAY, SUSPENSION (ML) NASAL DAILY
Qty: 1 BOTTLE | Refills: 0 | Status: SHIPPED | OUTPATIENT
Start: 2018-10-05

## 2018-10-05 RX ORDER — BETAMETHASONE SODIUM PHOSPHATE AND BETAMETHASONE ACETATE 3; 3 MG/ML; MG/ML
6 INJECTION, SUSPENSION INTRA-ARTICULAR; INTRALESIONAL; INTRAMUSCULAR; SOFT TISSUE
Status: COMPLETED | OUTPATIENT
Start: 2018-10-05 | End: 2018-10-05

## 2018-10-05 RX ORDER — PROMETHAZINE HYDROCHLORIDE AND DEXTROMETHORPHAN HYDROBROMIDE 6.25; 15 MG/5ML; MG/5ML
5 SYRUP ORAL
Qty: 180 ML | Refills: 0 | Status: SHIPPED | OUTPATIENT
Start: 2018-10-05 | End: 2018-10-10

## 2018-10-05 RX ORDER — LEVOCETIRIZINE DIHYDROCHLORIDE 5 MG/1
5 TABLET, FILM COATED ORAL NIGHTLY
Qty: 30 TABLET | Refills: 11 | Status: SHIPPED | OUTPATIENT
Start: 2018-10-05 | End: 2020-07-08

## 2018-10-05 RX ADMIN — BETAMETHASONE SODIUM PHOSPHATE AND BETAMETHASONE ACETATE 6 MG: 3; 3 INJECTION, SUSPENSION INTRA-ARTICULAR; INTRALESIONAL; INTRAMUSCULAR; SOFT TISSUE at 01:10

## 2018-10-05 NOTE — PATIENT INSTRUCTIONS
Allergic Rhinitis  Allergic rhinitis is an allergic reaction that affects the nose, and often the eyes. Its often known as nasal allergies. Nasal allergies are often due to things in the environment that are breathed in. Depending what you are sensitive to, nasal allergies may occur only during certain seasons. Or they may occur year round. Common indoor allergens include house dust mites, mold, cockroaches, and pet dander. Outdoor allergens include pollen from trees, grasses, and weeds.   Symptoms include a drippy, stuffy, and itchy nose. They also include sneezing and red and itchy eyes. You may feel tired more often. Severe allergies may also affect your breathing and trigger a condition called asthma.   Tests can be done to see what allergens are affecting you. You may be referred to an allergy specialist for testing and further evaluation.  Home care  Your healthcare provider may prescribe medicines to help relieve allergy symptoms. These may include oral medicines, nasal sprays, or eye drops.  Ask your provider for advice on how to avoid substances that you are allergic to. Below are a few tips for each type of allergen.  Pet dander:  · Do not have pets with fur and feathers.  · If you can't avoid having a pet, keep it out of your bedroom and off upholstered furniture.  Pollen:  · When pollen counts are high, keep windows of your car and home closed. If possible, use an air conditioner instead.  · Wear a filter mask when mowing or doing yard work.  House dust mites:  · Wash bedding every week in warm water and detergent and dry on a hot setting.  · Cover the mattress, box spring, and pillows with allergy covers.   · If possible, sleep in a room with no carpet, curtains, or upholstered furniture.  Cockroaches:  · Store food in sealed containers.  · Remove garbage from the home promptly.  · Fix water leaks  Mold:  · Keep humidity low by using a dehumidifier or air conditioner. Keep the dehumidifier and air  conditioner clean and free of mold.  · Clean moldy areas with bleach and water.  In general:  · Vacuum once or twice a week. If possible, use a vacuum with a high-efficiency particulate air (HEPA) filter.  · Do not smoke. Avoid cigarette smoke. Cigarette smoke is an irritant that can make symptoms worse.  Follow-up care  Follow up as advised by the healthcare provider or our staff. If you were referred to an allergy specialist, make this appointment promptly.  When to seek medical advice  Call your healthcare provider right away if the following occur:  · Coughing or wheezing  · Fever greater than 100.4°F (38°C)  · Hives (raised red bumps)  · Continuing symptoms, new symptoms, or worsening symptoms  Call 911 right away if you have:  · Trouble breathing  · Severe swelling of the face or severe itching of the eyes or mouth  Date Last Reviewed: 3/1/2017  © 1326-8012 [employer]. [address] [place] [address]. All rights reserved. This information is not intended as a substitute for professional medical care. Always follow your healthcare professional's instructions.      Please follow up with your Primary care provider within 2-5 days if your signs and symptoms have not resolved or worsen.     If your condition worsens or fails to improve we recommend that you receive another evaluation at the emergency room immediately or contact your primary medical clinic to discuss your concerns.   You must understand that you have received an Urgent Care treatment only and that you may be released before all of your medical problems are known or treated. You, the patient, will arrange for follow up care as instructed.     RED FLAGS/WARNING SYMPTOMS DISCUSSED WITH PATIENT THAT WOULD WARRANT EMERGENT MEDICAL ATTENTION. PATIENT VERBALIZED UNDERSTANDING.

## 2018-10-05 NOTE — PROGRESS NOTES
"Subjective:       Patient ID: Jane Carrasquillo is a 43 y.o. female.    Vitals:  height is 5' 5" (1.651 m) and weight is 93 kg (205 lb). Her blood pressure is 137/79. Her respiration is 18 and oxygen saturation is 99%.     Chief Complaint: URI    URI    This is a new problem. The current episode started yesterday. The problem has been gradually worsening. There has been no fever. Associated symptoms include congestion, coughing, ear pain, headaches, rhinorrhea, sinus pain and a sore throat. Pertinent negatives include no abdominal pain, chest pain, diarrhea, joint pain, nausea, rash, vomiting or wheezing. Treatments tried: Robtussin and Ibuprofen  The treatment provided no relief.     Review of Systems   Constitution: Positive for malaise/fatigue. Negative for chills and fever.   HENT: Positive for congestion, ear pain, rhinorrhea, sinus pain and sore throat. Negative for hoarse voice.    Eyes: Negative for blurred vision, discharge and redness.   Cardiovascular: Negative for chest pain, dyspnea on exertion and leg swelling.   Respiratory: Positive for cough. Negative for shortness of breath, sputum production and wheezing.    Skin: Negative for rash.   Musculoskeletal: Negative for back pain, joint pain and myalgias.   Gastrointestinal: Negative for abdominal pain, diarrhea, nausea and vomiting.   Neurological: Positive for headaches.   Psychiatric/Behavioral: The patient is not nervous/anxious.        Objective:      Physical Exam   Constitutional: She is oriented to person, place, and time. She appears well-developed and well-nourished. She is cooperative.  Non-toxic appearance. She does not appear ill. No distress.   HENT:   Head: Normocephalic and atraumatic.   Right Ear: Hearing, tympanic membrane, external ear and ear canal normal.   Left Ear: Hearing, tympanic membrane, external ear and ear canal normal.   Nose: Rhinorrhea present. No mucosal edema or nasal deformity. No epistaxis. Right sinus exhibits " maxillary sinus tenderness. Right sinus exhibits no frontal sinus tenderness. Left sinus exhibits no maxillary sinus tenderness and no frontal sinus tenderness.   Mouth/Throat: Uvula is midline, oropharynx is clear and moist and mucous membranes are normal. No trismus in the jaw. Normal dentition. No uvula swelling. No posterior oropharyngeal erythema.   Eyes: Conjunctivae and lids are normal. Right eye exhibits no discharge. Left eye exhibits no discharge. No scleral icterus.   Sclera clear bilat   Neck: Trachea normal, normal range of motion, full passive range of motion without pain and phonation normal. Neck supple.   Cardiovascular: Normal rate, regular rhythm, normal heart sounds, intact distal pulses and normal pulses.   Pulmonary/Chest: Effort normal and breath sounds normal. No respiratory distress.   Abdominal: Soft. Normal appearance and bowel sounds are normal. She exhibits no distension, no pulsatile midline mass and no mass. There is no tenderness.   Musculoskeletal: Normal range of motion. She exhibits no edema or deformity.   Neurological: She is alert and oriented to person, place, and time. She exhibits normal muscle tone. Coordination normal.   Skin: Skin is warm, dry and intact. She is not diaphoretic. No pallor.   Psychiatric: She has a normal mood and affect. Her speech is normal and behavior is normal. Judgment and thought content normal. Cognition and memory are normal.   Nursing note and vitals reviewed.      Assessment:       1. Seasonal allergic rhinitis, unspecified trigger        Plan:         Seasonal allergic rhinitis, unspecified trigger  -     betamethasone acetate-betamethasone sodium phosphate injection 6 mg; Inject 1 mL (6 mg total) into the muscle one time.  -     fluticasone (FLONASE) 50 mcg/actuation nasal spray; 2 sprays (100 mcg total) by Each Nare route once daily.  Dispense: 1 Bottle; Refill: 0  -     levocetirizine (XYZAL) 5 MG tablet; Take 1 tablet (5 mg total) by mouth  every evening.  Dispense: 30 tablet; Refill: 11  -     promethazine-dextromethorphan (PROMETHAZINE-DM) 6.25-15 mg/5 mL Syrp; Take 5 mLs by mouth every 4 to 6 hours as needed (cough).  Dispense: 180 mL; Refill: 0      Allergic Rhinitis  Allergic rhinitis is an allergic reaction that affects the nose, and often the eyes. Its often known as nasal allergies. Nasal allergies are often due to things in the environment that are breathed in. Depending what you are sensitive to, nasal allergies may occur only during certain seasons. Or they may occur year round. Common indoor allergens include house dust mites, mold, cockroaches, and pet dander. Outdoor allergens include pollen from trees, grasses, and weeds.   Symptoms include a drippy, stuffy, and itchy nose. They also include sneezing and red and itchy eyes. You may feel tired more often. Severe allergies may also affect your breathing and trigger a condition called asthma.   Tests can be done to see what allergens are affecting you. You may be referred to an allergy specialist for testing and further evaluation.  Home care  Your healthcare provider may prescribe medicines to help relieve allergy symptoms. These may include oral medicines, nasal sprays, or eye drops.  Ask your provider for advice on how to avoid substances that you are allergic to. Below are a few tips for each type of allergen.  Pet dander:  · Do not have pets with fur and feathers.  · If you can't avoid having a pet, keep it out of your bedroom and off upholstered furniture.  Pollen:  · When pollen counts are high, keep windows of your car and home closed. If possible, use an air conditioner instead.  · Wear a filter mask when mowing or doing yard work.  House dust mites:  · Wash bedding every week in warm water and detergent and dry on a hot setting.  · Cover the mattress, box spring, and pillows with allergy covers.   · If possible, sleep in a room with no carpet, curtains, or upholstered  furniture.  Cockroaches:  · Store food in sealed containers.  · Remove garbage from the home promptly.  · Fix water leaks  Mold:  · Keep humidity low by using a dehumidifier or air conditioner. Keep the dehumidifier and air conditioner clean and free of mold.  · Clean moldy areas with bleach and water.  In general:  · Vacuum once or twice a week. If possible, use a vacuum with a high-efficiency particulate air (HEPA) filter.  · Do not smoke. Avoid cigarette smoke. Cigarette smoke is an irritant that can make symptoms worse.  Follow-up care  Follow up as advised by the healthcare provider or our staff. If you were referred to an allergy specialist, make this appointment promptly.  When to seek medical advice  Call your healthcare provider right away if the following occur:  · Coughing or wheezing  · Fever greater than 100.4°F (38°C)  · Hives (raised red bumps)  · Continuing symptoms, new symptoms, or worsening symptoms  Call 911 right away if you have:  · Trouble breathing  · Severe swelling of the face or severe itching of the eyes or mouth  Date Last Reviewed: 3/1/2017  © 6938-6983 [employer]. [address] [place] [address]. All rights reserved. This information is not intended as a substitute for professional medical care. Always follow your healthcare professional's instructions.      Please follow up with your Primary care provider within 2-5 days if your signs and symptoms have not resolved or worsen.     If your condition worsens or fails to improve we recommend that you receive another evaluation at the emergency room immediately or contact your primary medical clinic to discuss your concerns.   You must understand that you have received an Urgent Care treatment only and that you may be released before all of your medical problems are known or treated. You, the patient, will arrange for follow up care as instructed.     RED FLAGS/WARNING SYMPTOMS DISCUSSED WITH PATIENT THAT WOULD  WARRANT EMERGENT MEDICAL ATTENTION. PATIENT VERBALIZED UNDERSTANDING.

## 2018-10-16 ENCOUNTER — OFFICE VISIT (OUTPATIENT)
Dept: URGENT CARE | Facility: CLINIC | Age: 43
End: 2018-10-16
Payer: COMMERCIAL

## 2018-10-16 DIAGNOSIS — S53.402D SPRAIN OF LEFT ELBOW, SUBSEQUENT ENCOUNTER: Primary | ICD-10-CM

## 2018-10-16 DIAGNOSIS — M48.02 SPINAL STENOSIS OF CERVICAL REGION: ICD-10-CM

## 2018-10-16 DIAGNOSIS — M25.522 LEFT ELBOW PAIN: ICD-10-CM

## 2018-10-16 DIAGNOSIS — M25.512 ACUTE PAIN OF LEFT SHOULDER: ICD-10-CM

## 2018-10-16 DIAGNOSIS — S43.402D SPRAIN OF LEFT SHOULDER, UNSPECIFIED SHOULDER SPRAIN TYPE, SUBSEQUENT ENCOUNTER: ICD-10-CM

## 2018-10-16 DIAGNOSIS — S13.9XXD NECK SPRAIN, SUBSEQUENT ENCOUNTER: ICD-10-CM

## 2018-10-16 PROCEDURE — 99213 OFFICE O/P EST LOW 20 MIN: CPT | Mod: S$GLB,,, | Performed by: PREVENTIVE MEDICINE

## 2018-10-16 NOTE — LETTER
[facility] [address] [place]  [place] [address]  Phone: [phone]  Fax: [phone]    Pt Name: Jane Matthews Date: 07/22/2018   Employee ID:  Date of Treatment: 10/16/2018   Company: [employer]      Appointment Time: 09:00 AM Arrived: 1636 PM   Provider: Spike Salmon MD Time Out:1720 PM     Office Treatment:   EXAM  FINISH CURRENT PT RX  MRI OF LEFT ELBOW ONCE APPROVED  REGULAR DUTY    1. Sprain of left elbow, subsequent encounter    2. Left elbow pain    3. Acute pain of left shoulder    4. Sprain of left shoulder, unspecified shoulder sprain type, subsequent encounter    5. Neck sprain, subsequent encounter          Patient Instructions: Daily home exercises/warm soaks, MRI to be scheduled once authorized. Continue Etodolac and Orphenadrine BID. Finish physcial therapy previously scheduled.       Restrictions: Regular Duty     Return Appointment: 10/30/2018 at 1600 PM

## 2018-10-18 DIAGNOSIS — S53.402D SPRAIN OF LEFT ELBOW, SUBSEQUENT ENCOUNTER: Primary | ICD-10-CM

## 2018-10-29 ENCOUNTER — TELEPHONE (OUTPATIENT)
Dept: URGENT CARE | Facility: CLINIC | Age: 43
End: 2018-10-29

## 2018-10-30 DIAGNOSIS — S53.402D SPRAIN OF LEFT ELBOW, SUBSEQUENT ENCOUNTER: Primary | ICD-10-CM

## 2018-11-02 ENCOUNTER — HOSPITAL ENCOUNTER (OUTPATIENT)
Dept: RADIOLOGY | Facility: HOSPITAL | Age: 43
Discharge: HOME | End: 2018-11-02
Attending: PREVENTIVE MEDICINE
Payer: COMMERCIAL

## 2018-11-02 DIAGNOSIS — S53.402D SPRAIN OF LEFT ELBOW, SUBSEQUENT ENCOUNTER: ICD-10-CM

## 2018-11-02 DIAGNOSIS — M25.522 LEFT ELBOW PAIN: ICD-10-CM

## 2018-11-02 PROCEDURE — 73221 MRI JOINT UPR EXTREM W/O DYE: CPT | Mod: TC,PO,LT

## 2018-11-06 ENCOUNTER — OFFICE VISIT (OUTPATIENT)
Dept: URGENT CARE | Facility: CLINIC | Age: 43
End: 2018-11-06
Payer: COMMERCIAL

## 2018-11-06 DIAGNOSIS — S46.219A TEAR OF BICEPS TENDON: ICD-10-CM

## 2018-11-06 DIAGNOSIS — S13.9XXD NECK SPRAIN, SUBSEQUENT ENCOUNTER: ICD-10-CM

## 2018-11-06 DIAGNOSIS — M25.512 ACUTE PAIN OF LEFT SHOULDER: ICD-10-CM

## 2018-11-06 DIAGNOSIS — S53.402D SPRAIN OF LEFT ELBOW, SUBSEQUENT ENCOUNTER: Primary | ICD-10-CM

## 2018-11-06 PROCEDURE — 99214 OFFICE O/P EST MOD 30 MIN: CPT | Mod: S$GLB,,, | Performed by: PREVENTIVE MEDICINE

## 2018-11-06 NOTE — PROGRESS NOTES
Subjective:       Patient ID: Jane Carrasquillo is a 43 y.o. female.    Chief Complaint: Arm Injury (left arm 7/22/18)    HPI  ROS    Objective:      Physical Exam    Assessment:       No diagnosis found.    Plan:                   No Follow-up on file.

## 2018-11-06 NOTE — LETTER
[facility] [place]  [place] [address]  Phone: [phone]  Fax: [phone]  [facility]: [phone]    Pt Name: Jane Carrasquillo  Injury Date: 07/22/2018   Employee ID:  Date of Treatment: 11/06/2018   Company: [employer]      Appointment Time: 09:00 AM Arrived: 9:00 AM   Provider: Spike Salmon MD Time Out: 11:15 AM     Office Treatment:   EXAM  REFERRAL TO SPECIALIST TO BE SCHEDULED ONCE AUTHORIZED  PATIENT TO BE EVALUATED BY DR. CONLEY   REGULAR DUTY     1. Sprain of left elbow, subsequent encounter    2. Tear of biceps tendon    3. Acute pain of left shoulder    4. Neck sprain, subsequent encounter          Patient Instructions: Daily home exercises/warm soaks, Referral to specialist to be scheduled, once authorized(Discussed results of MRI of left elbow which revealed a partial tear of the biceps tendon at its insertion right elbow.  Patient to be evaluated by Dr Conley orthopedics. )    Restrictions: Regular Duty(Continue Etodolac and Orphenadrine)     Return Appointment: 12/04/2018 @ 10:00 AM

## 2018-11-06 NOTE — PROGRESS NOTES
Subjective:       Patient ID: Jane Carrasquillo is a 43 y.o. female.    Chief Complaint: Arm Injury (left arm 7/22/18)    Patient is a follow up for left arm injury on 7/22/18. Patient states pain is worse 5/10. MRI 11/2/18. Working regular duty. Medications helping. Ambulatory. MJB      Arm Injury    The incident occurred more than 1 week ago. The incident occurred at work. The pain is present in the upper left arm. The quality of the pain is described as aching, cramping and shooting. The pain does not radiate. The pain is at a severity of 5/10. The pain is moderate. The pain has been constant since the incident. Associated symptoms include muscle weakness. Pertinent negatives include no chest pain. The symptoms are aggravated by movement and lifting. She has tried NSAIDs for the symptoms. The treatment provided mild relief.     Review of Systems   Constitution: Negative for chills and fever.   HENT: Negative for sore throat.    Eyes: Negative for blurred vision.   Cardiovascular: Negative for chest pain.   Respiratory: Negative for shortness of breath.    Skin: Negative for rash.   Musculoskeletal: Positive for stiffness. Negative for back pain and joint pain.   Gastrointestinal: Negative for abdominal pain, diarrhea, nausea and vomiting.   Neurological: Negative for headaches.   Psychiatric/Behavioral: The patient is not nervous/anxious.        Objective:      Physical Exam   Constitutional: She appears well-developed and well-nourished.   HENT:   Head: Normocephalic.   Eyes: Pupils are equal, round, and reactive to light.   Neck: Normal range of motion.   Cardiovascular: Normal rate.   Pulmonary/Chest: Effort normal.   Musculoskeletal:        Left shoulder: She exhibits decreased range of motion and tenderness. She exhibits no bony tenderness, no swelling, no effusion, no crepitus, no deformity, no laceration, no pain, no spasm, normal pulse and normal strength.        Left elbow: She exhibits decreased range of  motion. She exhibits no swelling, no effusion, no deformity and no laceration. Tenderness found. Medial epicondyle and lateral epicondyle tenderness noted. No radial head and no olecranon process tenderness noted.        Lumbar back: She exhibits no bony tenderness, no swelling, no edema, no deformity, no laceration, no spasm and normal pulse.        Arms:  Range of motion of left shoulder much improved with strength testing. No deficits with flexion, extension and abduction of left shoulder. Pulses good.   Pain persists about left elbow with palpation. Pain about the medial and lateral aspects of left elbow with flexion, extension and supination of left elbow.   Neurological: She is alert.   No focal neurologic deficits   Skin: Skin is warm.   Psychiatric: She has a normal mood and affect.   Nursing note and vitals reviewed.      Assessment:       1. Sprain of left elbow, subsequent encounter    2. Tear of biceps tendon    3. Acute pain of left shoulder    4. Neck sprain, subsequent encounter        Plan:            Patient Instructions: Daily home exercises/warm soaks, Referral to specialist to be scheduled, once authorized(Discussed results of MRI of left elbow which revealed a partial tear of the biceps tendon at its insertion right elbow.  Patient to be evaluated by Dr Conley orthopedics. )   Restrictions: Regular Duty  Follow-up in about 4 weeks (around 12/4/2018).

## 2018-11-08 ENCOUNTER — TELEPHONE (OUTPATIENT)
Dept: ORTHOPEDICS | Facility: CLINIC | Age: 43
End: 2018-11-08

## 2018-11-26 ENCOUNTER — OFFICE VISIT (OUTPATIENT)
Dept: ORTHOPEDICS | Facility: CLINIC | Age: 43
End: 2018-11-26
Payer: COMMERCIAL

## 2018-11-26 ENCOUNTER — TELEPHONE (OUTPATIENT)
Dept: ORTHOPEDICS | Facility: CLINIC | Age: 43
End: 2018-11-26

## 2018-11-26 VITALS — WEIGHT: 205 LBS | HEIGHT: 66 IN | BODY MASS INDEX: 32.95 KG/M2

## 2018-11-26 DIAGNOSIS — M75.22 BICEPS TENDINITIS OF LEFT UPPER EXTREMITY: ICD-10-CM

## 2018-11-26 DIAGNOSIS — M25.522 LEFT ELBOW PAIN: Primary | ICD-10-CM

## 2018-11-26 PROCEDURE — 99203 OFFICE O/P NEW LOW 30 MIN: CPT | Mod: S$GLB,,, | Performed by: ORTHOPAEDIC SURGERY

## 2018-11-26 PROCEDURE — 99999 PR PBB SHADOW E&M-EST. PATIENT-LVL III: CPT | Mod: PBBFAC,,, | Performed by: ORTHOPAEDIC SURGERY

## 2018-11-26 NOTE — PROGRESS NOTES
INITIAL VISIT HISTORY:  A 43-year-old female presents for evaluation of left   elbow and arm pain of several months' duration.  Symptoms began in June 2018 and   she reported in July to [employer].  She basically was having pain in her   left elbow, which seemed to be worse with lifting and pulling activities which   she had to do quite a bit of it at work.  For a short period of time, she was on   light duty and then she return to full duty work.  She did have a short course   of therapy, but apparently she stopped therapy, symptoms recurred.  She recently   had an MRI scan of the left elbow, which showed a strain of the biceps tendon   or possible tendinitis and she is referred for treatment.  Interestingly enough,   she does have some neck problems and she reports pain from her neck, which   radiates down her left arm occasionally into forearm area.  The pain initially   was in her elbow, seems like it may have moved to her forearm recently.    No numbness or tingling is reported.    PAST MEDICAL HISTORY:  Significant for asthma, hypertension and hyperthyroidism.    PAST SURGICAL HISTORY:  Includes bunionectomy.    FAMILY HISTORY:  Positive for hyperlipidemia and hypertension as well as   diabetes.    SOCIAL HISTORY:  The patient does not smoke or drink.    REVIEW OF SYSTEMS:  Negative fevers, chills, rashes.    CURRENT MEDICATIONS:  Reviewed on chart.    ALLERGIES:  None.    PHYSICAL EXAMINATION:  GENERAL:  Well-developed, well-nourished female in no acute distress, alert and   oriented x3.  MUSCULOSKELETAL:  Examination of the upper extremities significant for the left   elbow, demonstrating some mild tenderness over the biceps tendon, which is   intact.  There is no bruising, no swelling in the elbow or the antecubital   fossa.  Range of motion of left elbow is full.  She has some pain with resisted   elbow flexion localized to the biceps, but she has pretty good strength.    Triceps also intact.  No  instability.  Tinel sign is negative at the elbow and   wrist.  Range of motion of left shoulder is full.  Negative impingement sign.    Good strength.  Neck a little bit tender on the left side with some mild muscle   spasm.    IMAGING:  MRI reviewed, left elbow, demonstrate some evidence of biceps   tendinitis of the biceps tendon near its insertion.    IMPRESSION:  1.  Biceps tendinitis, left elbow.  2.  Possible superimposed cervical radiculopathy, left arm.    PLAN:  I explained the nature of the problem to the patient in the elbow.  I do   believe she has some tendinitis.  I do think it is torn based on the MRI, but   interestingly enough, I do not think the elbow symptoms would account for her   neck pain or the pain, which seems to radiate up and down the left arm.    Although she does not have any numbness in the arm, I have ordered a nerve   conduction study to rule out any cervical radiculopathy or pinched nerve   compression neuropathy type.    For the elbow, I have ordered some therapy for the left arm.  I know she has   been through this before, but I think another round would be helpful and I have   started her back on anti-inflammatory medication by mouth.  She is currently on   full duty.  I think she can stay on that unless symptoms get worse.  Follow up   after the nerve test is complete.      JERI  dd: 11/26/2018 11:35:44 (CST)  td: 11/27/2018 05:45:22 (CST)  Doc ID   #[id number]  Job ID #[id number]    CC:

## 2018-11-26 NOTE — LETTER
November 26, 2018        Spike Salmon MD  [address]  [address]  [facility] [address]             [facility]  [address]  [facility] [address]  Phone: [phone]   Patient: Jane Carrasquillo   MR Number: [id number]   YOB: 1975   Date of Visit: 11/26/2018       Dear Dr. Salmon:    Thank you for referring Jane Carrasquillo to me for evaluation. Below are the relevant portions of my assessment and plan of care.            If you have questions, please do not hesitate to call me. I look forward to following Jane along with you.    Sincerely,      Damaso Conley Jr., MD           CC  No Recipients

## 2018-11-26 NOTE — TELEPHONE ENCOUNTER
----- Message from Jackie Mantilla sent at 11/26/2018 10:55 AM CST -----  Arina with [facility] called.   No. [phone]   Arina needs the 10 10 approval for therapy.

## 2018-11-28 ENCOUNTER — TELEPHONE (OUTPATIENT)
Dept: ORTHOPEDICS | Facility: CLINIC | Age: 43
End: 2018-11-28

## 2018-11-28 NOTE — TELEPHONE ENCOUNTER
----- Message from Bella Colbert sent at 11/28/2018  3:15 PM CST -----  Contact: [facility] [phone]  Arina is requesting a 1010 approval before physical therapy can start

## 2018-12-05 ENCOUNTER — TELEPHONE (OUTPATIENT)
Dept: ORTHOPEDICS | Facility: CLINIC | Age: 43
End: 2018-12-05

## 2018-12-05 NOTE — TELEPHONE ENCOUNTER
LM informing received notification that EMG was approved through [facility] and pt is able to schedule at this time. Advised to call back when ready to schedule.

## 2018-12-07 ENCOUNTER — TELEPHONE (OUTPATIENT)
Dept: ORTHOPEDICS | Facility: CLINIC | Age: 43
End: 2018-12-07

## 2018-12-11 ENCOUNTER — TELEPHONE (OUTPATIENT)
Dept: ORTHOPEDICS | Facility: CLINIC | Age: 43
End: 2018-12-11

## 2019-01-10 ENCOUNTER — OFFICE VISIT (OUTPATIENT)
Dept: URGENT CARE | Facility: CLINIC | Age: 44
End: 2019-01-10
Payer: COMMERCIAL

## 2019-01-10 VITALS
HEIGHT: 66 IN | TEMPERATURE: 98 F | DIASTOLIC BLOOD PRESSURE: 90 MMHG | WEIGHT: 208 LBS | BODY MASS INDEX: 33.43 KG/M2 | HEART RATE: 68 BPM | SYSTOLIC BLOOD PRESSURE: 136 MMHG

## 2019-01-10 DIAGNOSIS — S80.01XA CONTUSION OF RIGHT KNEE, INITIAL ENCOUNTER: ICD-10-CM

## 2019-01-10 DIAGNOSIS — S63.502A SPRAIN AND STRAIN OF LEFT WRIST: ICD-10-CM

## 2019-01-10 DIAGNOSIS — S20.212A CONTUSION OF LEFT CHEST WALL, INITIAL ENCOUNTER: ICD-10-CM

## 2019-01-10 DIAGNOSIS — S20.211A CONTUSION OF RIBS, RIGHT, INITIAL ENCOUNTER: ICD-10-CM

## 2019-01-10 DIAGNOSIS — S66.912A SPRAIN AND STRAIN OF LEFT WRIST: ICD-10-CM

## 2019-01-10 DIAGNOSIS — S30.0XXA CONTUSION OF LOWER BACK AND PELVIS, INITIAL ENCOUNTER: Primary | ICD-10-CM

## 2019-01-10 DIAGNOSIS — S70.12XA CONTUSION OF LEFT THIGH, INITIAL ENCOUNTER: ICD-10-CM

## 2019-01-10 PROCEDURE — 99214 OFFICE O/P EST MOD 30 MIN: CPT | Mod: S$GLB,,, | Performed by: PREVENTIVE MEDICINE

## 2019-01-10 PROCEDURE — 99214 PR OFFICE/OUTPT VISIT, EST, LEVL IV, 30-39 MIN: ICD-10-PCS | Mod: S$GLB,,, | Performed by: PREVENTIVE MEDICINE

## 2019-01-10 RX ORDER — IBUPROFEN 600 MG/1
600 TABLET ORAL EVERY 6 HOURS PRN
Qty: 60 TABLET | Refills: 2 | Status: SHIPPED | OUTPATIENT
Start: 2019-01-10 | End: 2019-03-07 | Stop reason: ALTCHOICE

## 2019-01-10 NOTE — LETTER
[facility] [place] [address] [place] [address]  Phone: [phone]  Fax: [phone]  [facility]: [id number]    Pt Name: Jane Carrasquillo  Injury Date: 01/05/2019   Employee ID:  Date of Treatment: 01/10/2019   Company: [employer]      Appointment Time: 03:45 PM Arrived: 3:58PM   Provider: Spike Salmon MD Time Out: 5:15PM     Office Treatment:   EXAM:  RX GIVEN  REGULAR DUTY    1. Contusion of lower back and pelvis, initial encounter    2. Contusion of left thigh, initial encounter    3. Sprain and strain of left wrist    4. Contusion of right knee, initial encounter    5. Contusion of ribs, right, initial encounter    6. Contusion of left chest wall, initial encounter      Medications Ordered This Encounter   Medications    ibuprofen (ADVIL,MOTRIN) 600 MG tablet      Patient Instructions: Daily home exercises/warm soaks      Restrictions: Regular Duty     Return Appointment: 1/15/2019 at 11:00AM
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
## 2019-01-15 ENCOUNTER — OFFICE VISIT (OUTPATIENT)
Dept: URGENT CARE | Facility: CLINIC | Age: 44
End: 2019-01-15
Payer: COMMERCIAL

## 2019-01-15 DIAGNOSIS — S20.211D CONTUSION OF RIBS, RIGHT, SUBSEQUENT ENCOUNTER: ICD-10-CM

## 2019-01-15 DIAGNOSIS — S66.912A SPRAIN AND STRAIN OF LEFT WRIST: ICD-10-CM

## 2019-01-15 DIAGNOSIS — S20.212D CONTUSION OF LEFT CHEST WALL, SUBSEQUENT ENCOUNTER: ICD-10-CM

## 2019-01-15 DIAGNOSIS — S63.502A SPRAIN AND STRAIN OF LEFT WRIST: ICD-10-CM

## 2019-01-15 DIAGNOSIS — S80.01XD CONTUSION OF RIGHT KNEE, SUBSEQUENT ENCOUNTER: ICD-10-CM

## 2019-01-15 DIAGNOSIS — S30.0XXD CONTUSION OF LOWER BACK AND PELVIS, SUBSEQUENT ENCOUNTER: Primary | ICD-10-CM

## 2019-01-15 PROCEDURE — 99214 PR OFFICE/OUTPT VISIT, EST, LEVL IV, 30-39 MIN: ICD-10-PCS | Mod: S$GLB,,, | Performed by: PREVENTIVE MEDICINE

## 2019-01-15 PROCEDURE — 99214 OFFICE O/P EST MOD 30 MIN: CPT | Mod: S$GLB,,, | Performed by: PREVENTIVE MEDICINE

## 2019-01-15 NOTE — LETTER
[facility] [address] [place] [address]  Phone: [phone]  Fax: [phone]  [facility]: [id number]    Pt Name: Jane Carrasquillo  Injury Date: 01/05/2019   Employee ID:  Date of Treatment: 01/15/2019   Company: [employer]      Appointment Time: 10:45 AM Arrived: 11:34AM   Provider: Spike Salmon MD Time Out: 1:15PM     Office Treatment:   EXAM:  REGULAR DUTY    1. Contusion of lower back and pelvis, subsequent encounter    2. Sprain and strain of left wrist    3. Contusion of right knee, subsequent encounter    4. Contusion of left chest wall, subsequent encounter    5. Contusion of ribs, right, subsequent encounter          Patient Instructions: Daily home exercises/warm soaks(Continue Ibuprofen 600mg TID)       Restrictions: NONE   Regular Duty     Return Appointment: 1/29/2019 at 10:30AM

## 2019-01-15 NOTE — PROGRESS NOTES
Subjective:       Patient ID: Jane Carrasquillo is a 43 y.o. female.    Chief Complaint: Wrist Pain; Chest Pain; Back Pain; and Knee Pain    Pt returned to the clinic today for a follow up visit for back, right knee, left wrist and abdominal pain. IJ       Wrist Pain    This is a recurrent problem. The pain is at a severity of 2/10. Pertinent negatives include no numbness or stiffness.   Chest Pain    This is a recurrent problem. The pain is at a severity of 2/10. Associated symptoms include back pain. Pertinent negatives include no abdominal pain, malaise/fatigue or numbness.   Pertinent negatives for past medical history include no muscle weakness.   Back Pain   This is a recurrent problem. The pain is at a severity of 2/10. The pain is mild. Associated symptoms include chest pain. Pertinent negatives include no abdominal pain, bladder incontinence, bowel incontinence, dysuria or numbness.   Knee Pain    The incident occurred more than 1 week ago. The pain is at a severity of 2/10. The pain has been improving since onset. Pertinent negatives include no numbness.     Review of Systems   Constitution: Negative for malaise/fatigue.   Cardiovascular: Positive for chest pain.   Skin: Negative for rash.   Musculoskeletal: Positive for back pain, joint pain and joint swelling. Negative for muscle cramps, muscle weakness and stiffness.   Gastrointestinal: Negative for abdominal pain and bowel incontinence.   Genitourinary: Negative for bladder incontinence, dysuria, hematuria and urgency.   Neurological: Negative for disturbances in coordination and numbness.   All other systems reviewed and are negative.      Objective:      Physical Exam   Constitutional: She appears well-developed and well-nourished.   HENT:   Head: Normocephalic and atraumatic.   Eyes: EOM are normal. Pupils are equal, round, and reactive to light.   Neck: Normal range of motion. Neck supple.   Cardiovascular: Normal rate and regular rhythm.    Pulmonary/Chest: Effort normal and breath sounds normal.           There is pain to palpation of the right rather lateral rib region about the axillary component of right shoulder.  There is no swelling ecchymosis or abrasion noted about the right lateral ribs.  Full range of motion of the chest wall with no pain. Lungs are clear to auscultation and percussion throughout.    There is mild pain with palpation of the left anterior chest wall.  No swelling or ecchymosis noted about the chest wall or breast region.  Lungs are clear to auscultation in this region as well.   Abdominal: Soft. Bowel sounds are normal. There is tenderness in the left lower quadrant. No hernia.       Ecchymosis noted about left lower abdomen with pain on palpation. No swelling or erythema noted.No rebound and no hernia.  Bowel sounds active   Musculoskeletal:        Right knee: She exhibits decreased range of motion and swelling. She exhibits no effusion, no ecchymosis, no deformity, no laceration, no erythema, normal alignment and no LCL laxity. No tenderness found. No medial joint line, no lateral joint line, no MCL, no LCL and no patellar tendon tenderness noted.        Lumbar back: She exhibits decreased range of motion, tenderness and pain. She exhibits no bony tenderness, no swelling, no edema, no deformity, no laceration, no spasm and normal pulse.        Back:         Arms:       Legs:  Mild swelling with ecchymosis noted about left low back and buttock.  There is pain with palpation of the low back on the left.  There is pain with forward flexion to 90°, extension to 25° and lateral bending to the right and left to 45°.  Patient has full range of motion of the thoracic spine with no pain. There are no motor or sensory deficits about the lower extremities.    There is swelling with pain on palpation of the anterior aspect of the right knee.  No joint line effusion noted. Patient has pain with full flexion and extension of the right  knee.  Anterior posterior drawer signs are negative.  Bo sign is negative.    There is swelling with mild erythema about the left wrist dorsal surface.  There is pain with palpation throughout the left wrist both palmar and dorsal aspects.  Pulses intact. Patient complains of pain with full flexion extension and rotation of the left wrist.  There are no motor or sensory deficits about the right hand and wrist.   Neurological: She is alert.   No focal neurologic deficits   Skin: Skin is warm.   Psychiatric: She has a normal mood and affect.   Nursing note and vitals reviewed.      Assessment:       1. Contusion of lower back and pelvis, subsequent encounter    2. Sprain and strain of left wrist    3. Contusion of right knee, subsequent encounter    4. Contusion of left chest wall, subsequent encounter    5. Contusion of ribs, right, subsequent encounter        Plan:            Patient Instructions: Daily home exercises/warm soaks(Continue Ibuprofen 600mg TID)   Restrictions: Regular Duty  Follow-up in about 2 weeks (around 1/29/2019).

## 2019-01-31 ENCOUNTER — OFFICE VISIT (OUTPATIENT)
Dept: URGENT CARE | Facility: CLINIC | Age: 44
End: 2019-01-31
Payer: COMMERCIAL

## 2019-01-31 ENCOUNTER — TELEPHONE (OUTPATIENT)
Dept: ORTHOPEDICS | Facility: CLINIC | Age: 44
End: 2019-01-31

## 2019-01-31 DIAGNOSIS — S66.912A SPRAIN AND STRAIN OF LEFT WRIST: ICD-10-CM

## 2019-01-31 DIAGNOSIS — S20.211D CONTUSION OF RIBS, RIGHT, SUBSEQUENT ENCOUNTER: ICD-10-CM

## 2019-01-31 DIAGNOSIS — S80.01XD CONTUSION OF RIGHT KNEE, SUBSEQUENT ENCOUNTER: ICD-10-CM

## 2019-01-31 DIAGNOSIS — S30.0XXD CONTUSION OF LOWER BACK AND PELVIS, SUBSEQUENT ENCOUNTER: Primary | ICD-10-CM

## 2019-01-31 DIAGNOSIS — S20.212D CONTUSION OF LEFT CHEST WALL, SUBSEQUENT ENCOUNTER: ICD-10-CM

## 2019-01-31 DIAGNOSIS — S63.502A SPRAIN AND STRAIN OF LEFT WRIST: ICD-10-CM

## 2019-01-31 PROCEDURE — 99214 OFFICE O/P EST MOD 30 MIN: CPT | Mod: S$GLB,,, | Performed by: PREVENTIVE MEDICINE

## 2019-01-31 PROCEDURE — 99214 PR OFFICE/OUTPT VISIT, EST, LEVL IV, 30-39 MIN: ICD-10-PCS | Mod: S$GLB,,, | Performed by: PREVENTIVE MEDICINE

## 2019-01-31 NOTE — TELEPHONE ENCOUNTER
----- Message from Andrés Welch sent at 1/30/2019  1:28 PM CST -----  Contact: self/[phone]  Patient called to speak with your office about getting a nerve test scheduled.    Please call and advise.

## 2019-01-31 NOTE — LETTER
[facility]  [address] [place] [address]  Phone: [phone]  Fax: [phone]  [facility]: [id number]    Pt Name: Jane Carrasquillo  Injury Date: 01/05/2019   Employee ID:  Date of Treatment: 01/31/2019   Company: [employer]      Appointment Time: 09:00 AM Arrived: 11:11AM   Provider: Spike Salmon MD Time Out: 12:10PM     Office Treatment:   EXAM:  MRI TO BE SCHEDULED ONCE AUTHORIZED  REGULAR DUTY    1. Contusion of lower back and pelvis, subsequent encounter    2. Contusion of right knee, subsequent encounter    3. Sprain and strain of left wrist    4. Contusion of left chest wall, subsequent encounter    5. Contusion of ribs, right, subsequent encounter          Patient Instructions: Daily home exercises/warm soaks, MRI to be scheduled once authorized      Restrictions: Regular Duty(Continue Ibuprofen)     Return Appointment: 2/14/2019 at 10:00AM

## 2019-01-31 NOTE — PROGRESS NOTES
Subjective:       Patient ID: Jane Carrasquillo is a 43 y.o. female.    Chief Complaint: Knee Pain (right)    Follow up (doi 1/5/19) Patient states pain level today is a 4/10. She states her right knee is still hurting, but everything else feels much better. She is taking Ibuprofen PRN with significant relief. LM      Review of Systems   Constitution: Negative for chills and fever.   HENT: Negative for sore throat.    Eyes: Negative for blurred vision.   Cardiovascular: Negative for chest pain.   Respiratory: Negative for shortness of breath.    Skin: Negative for rash.   Musculoskeletal: Positive for joint pain. Negative for back pain.   Gastrointestinal: Negative for abdominal pain, diarrhea, nausea and vomiting.   Neurological: Negative for headaches.   Psychiatric/Behavioral: The patient is not nervous/anxious.    All other systems reviewed and are negative.      Objective:      Physical Exam   Constitutional: She appears well-developed and well-nourished.   HENT:   Head: Normocephalic and atraumatic.   Eyes: EOM are normal. Pupils are equal, round, and reactive to light.   Neck: Normal range of motion. Neck supple.   Cardiovascular: Normal rate and regular rhythm.   Pulmonary/Chest: Effort normal and breath sounds normal.   There is pain to palpation of the right rather lateral rib region about the axillary component of right shoulder.  There is no swelling ecchymosis or abrasion noted about the right lateral ribs.  Full range of motion of the chest wall with no pain. Lungs are clear to auscultation and percussion throughout.    There is mild pain with palpation of the left anterior chest wall.  No swelling or ecchymosis noted about the chest wall or breast region.  Lungs are clear to auscultation in this region as well.   Abdominal: Soft. Bowel sounds are normal. There is tenderness in the left lower quadrant. No hernia.   Ecchymosis noted about left lower abdomen with pain on palpation. No swelling or erythema  noted.No rebound and no hernia.  Bowel sounds active   Musculoskeletal:        Right knee: She exhibits decreased range of motion and swelling. She exhibits no effusion, no ecchymosis, no deformity, no laceration, no erythema, normal alignment and no LCL laxity. No tenderness found. No medial joint line, no lateral joint line, no MCL, no LCL and no patellar tendon tenderness noted.        Lumbar back: She exhibits decreased range of motion, tenderness and pain. She exhibits no bony tenderness, no swelling, no edema, no deformity, no laceration, no spasm and normal pulse.        Arms:       Legs:  Mild swelling with ecchymosis noted about left low back and buttock.  There is pain with palpation of the low back on the left.  There is pain with forward flexion to 90°, extension to 25° and lateral bending to the right and left to 45°.  Patient has full range of motion of the thoracic spine with no pain. There are no motor or sensory deficits about the lower extremities.    There is swelling with pain on palpation of the anterior aspect of the right knee.  No joint line effusion noted. Patient has pain with full flexion and extension of the right knee.  Anterior posterior drawer signs are negative.  Bo sign is negative.    There is swelling with mild erythema about the left wrist dorsal surface.  There is pain with palpation throughout the left wrist both palmar and dorsal aspects.  Pulses intact. Patient complains of pain with full flexion extension and rotation of the left wrist.  There are no motor or sensory deficits about the right hand and wrist.   Neurological: She is alert.   No focal neurologic deficits   Skin: Skin is warm.   Psychiatric: She has a normal mood and affect.   Nursing note and vitals reviewed.      Assessment:       1. Contusion of lower back and pelvis, subsequent encounter    2. Contusion of right knee, subsequent encounter    3. Sprain and strain of left wrist    4. Contusion of left  chest wall, subsequent encounter    5. Contusion of ribs, right, subsequent encounter        Plan:            Patient Instructions: Daily home exercises/warm soaks, MRI to be scheduled once authorized   Restrictions: Regular Duty(Continue Ibuprofen)  Follow-up in about 2 weeks (around 2/14/2019).

## 2019-02-04 ENCOUNTER — TELEPHONE (OUTPATIENT)
Dept: ORTHOPEDICS | Facility: CLINIC | Age: 44
End: 2019-02-04

## 2019-02-04 NOTE — TELEPHONE ENCOUNTER
----- Message from Jadyn Florez sent at 2/4/2019  1:17 PM CST -----  Contact: [phone]/self  Patient states she is returning your call. Please advise.

## 2019-02-04 NOTE — TELEPHONE ENCOUNTER
Spoke with pt, informed referral for EMG was approved through workers comp. Gave number for [facility] neuro dept to get scheduled. Understanding verbalized.

## 2019-02-28 ENCOUNTER — HOSPITAL ENCOUNTER (OUTPATIENT)
Dept: RADIOLOGY | Facility: HOSPITAL | Age: 44
Discharge: HOME | End: 2019-02-28
Attending: PREVENTIVE MEDICINE
Payer: COMMERCIAL

## 2019-02-28 DIAGNOSIS — S80.01XD CONTUSION OF RIGHT KNEE, SUBSEQUENT ENCOUNTER: ICD-10-CM

## 2019-02-28 PROCEDURE — 73721 MRI JNT OF LWR EXTRE W/O DYE: CPT | Mod: TC,PO,RT

## 2019-03-07 ENCOUNTER — OCCUPATIONAL HEALTH (OUTPATIENT)
Dept: URGENT CARE | Facility: CLINIC | Age: 44
End: 2019-03-07
Payer: COMMERCIAL

## 2019-03-07 DIAGNOSIS — M71.21 BAKER'S CYST OF KNEE, RIGHT: ICD-10-CM

## 2019-03-07 DIAGNOSIS — S30.0XXD CONTUSION OF LOWER BACK AND PELVIS, SUBSEQUENT ENCOUNTER: ICD-10-CM

## 2019-03-07 DIAGNOSIS — S20.212D CONTUSION OF LEFT CHEST WALL, SUBSEQUENT ENCOUNTER: ICD-10-CM

## 2019-03-07 DIAGNOSIS — S63.502A SPRAIN AND STRAIN OF LEFT WRIST: ICD-10-CM

## 2019-03-07 DIAGNOSIS — S20.211D CONTUSION OF RIBS, RIGHT, SUBSEQUENT ENCOUNTER: ICD-10-CM

## 2019-03-07 DIAGNOSIS — S80.01XD CONTUSION OF RIGHT KNEE, SUBSEQUENT ENCOUNTER: Primary | ICD-10-CM

## 2019-03-07 DIAGNOSIS — S66.912A SPRAIN AND STRAIN OF LEFT WRIST: ICD-10-CM

## 2019-03-07 PROCEDURE — 99214 PR OFFICE/OUTPT VISIT, EST, LEVL IV, 30-39 MIN: ICD-10-PCS | Mod: S$GLB,,, | Performed by: PREVENTIVE MEDICINE

## 2019-03-07 PROCEDURE — 99214 OFFICE O/P EST MOD 30 MIN: CPT | Mod: S$GLB,,, | Performed by: PREVENTIVE MEDICINE

## 2019-03-07 RX ORDER — IBUPROFEN 800 MG/1
800 TABLET ORAL 3 TIMES DAILY
Qty: 30 TABLET | Refills: 0 | Status: SHIPPED | OUTPATIENT
Start: 2019-03-07 | End: 2019-04-30

## 2019-03-07 NOTE — LETTER
[facility] [address] [place] [address]  Phone: [phone]  Fax: [phone]  [facility]: [id number]    Pt Name: Jane Carrasquillo  Injury Date: 01/05/2019   Employee ID:  Date of Treatment: 03/07/2019   Company: [employer]      Appointment Time: 01:10 PM Arrived: 11:13AM   Provider: Dr. Salmon Time Out: 1:30PM     Office Treatment:   EXAM  RX GIVEN  ORTHO TO BE SCHEDULED ONCE AUTHORIZED  LIGHT DUTY    1. Contusion of right knee, subsequent encounter    2. Baker's cyst of knee, right    3. Contusion of lower back and pelvis, subsequent encounter    4. Sprain and strain of left wrist    5. Contusion of left chest wall, subsequent encounter    6. Contusion of ribs, right, subsequent encounter      Medications Ordered This Encounter   Medications    ibuprofen (ADVIL,MOTRIN) 800 MG tablet      Patient Instructions: Daily home exercises/warm soaks, Referral to specialist to be scheduled, once authorized      Restrictions: Avoid climbing/kneeling/squatting, Sit or stand as needed     Return Appointment: 3/21/2019 at 11:30AM

## 2019-03-07 NOTE — PROGRESS NOTES
Subjective:       Patient ID: Jane Carrasquillo is a 44 y.o. female.    Chief Complaint: Knee Injury (right doi 1/5/19)    Patient is a follow up for right knee from 1/5/19. Pain is 0/10 because she just took meds. Patient states pain has worsened. Bending and exertion increase pain. MRI done 2/28/19. Working regular duty. Ambulatory. MJB      Knee Injury   This is a recurrent problem. The current episode started more than 1 month ago. The problem occurs daily. The problem has been gradually worsening. Pertinent negatives include no abdominal pain, chest pain, chills, fever, headaches, nausea, rash, sore throat or vomiting. The symptoms are aggravated by exertion and bending. She has tried oral narcotics for the symptoms. The treatment provided moderate relief.     Review of Systems   Constitution: Negative for chills and fever.   HENT: Negative for sore throat.    Eyes: Negative for blurred vision.   Cardiovascular: Negative for chest pain.   Respiratory: Negative for shortness of breath.    Skin: Negative for rash.   Musculoskeletal: Positive for joint pain and stiffness. Negative for back pain.   Gastrointestinal: Negative for abdominal pain, diarrhea, nausea and vomiting.   Neurological: Negative for headaches.   Psychiatric/Behavioral: The patient is not nervous/anxious.        Objective:      Physical Exam   Constitutional: She appears well-developed and well-nourished.   HENT:   Head: Normocephalic and atraumatic.   Eyes: EOM are normal. Pupils are equal, round, and reactive to light.   Neck: Normal range of motion. Neck supple.   Cardiovascular: Normal rate and regular rhythm.   Pulmonary/Chest: Effort normal and breath sounds normal.   There is pain to palpation of the right rather lateral rib region about the axillary component of right shoulder.  There is no swelling ecchymosis or abrasion noted about the right lateral ribs.  Full range of motion of the chest wall with no pain. Lungs are clear to  auscultation and percussion throughout.    There is mild pain with palpation of the left anterior chest wall.  No swelling or ecchymosis noted about the chest wall or breast region.  Lungs are clear to auscultation in this region as well.   Abdominal: Soft. Bowel sounds are normal. There is tenderness in the left lower quadrant. No hernia.   Ecchymosis noted about left lower abdomen with pain on palpation. No swelling or erythema noted.No rebound and no hernia.  Bowel sounds active   Musculoskeletal:        Right knee: She exhibits decreased range of motion and swelling. She exhibits no effusion, no ecchymosis, no deformity, no laceration, no erythema, normal alignment and no LCL laxity. No tenderness found. No medial joint line, no lateral joint line, no MCL, no LCL and no patellar tendon tenderness noted.        Lumbar back: She exhibits decreased range of motion, tenderness and pain. She exhibits no bony tenderness, no swelling, no edema, no deformity, no laceration, no spasm and normal pulse.        Arms:       Legs:  Mild swelling with ecchymosis noted about left low back and buttock.  There is pain with palpation of the low back on the left.  There is pain with forward flexion to 90°, extension to 25° and lateral bending to the right and left to 45°.  Patient has full range of motion of the thoracic spine with no pain. There are no motor or sensory deficits about the lower extremities.    There is swelling with pain on palpation of the anterior aspect of the right knee.  No joint line effusion noted. Patient has pain with full flexion and extension of the right knee.  Anterior posterior drawer signs are negative.  Bo sign is negative.    There is swelling with mild erythema about the left wrist dorsal surface.  There is pain with palpation throughout the left wrist both palmar and dorsal aspects.  Pulses intact. Patient complains of pain with full flexion extension and rotation of the left wrist.  There  are no motor or sensory deficits about the right hand and wrist.   Neurological: She is alert.   No focal neurologic deficits   Skin: Skin is warm.   Psychiatric: She has a normal mood and affect.   Nursing note and vitals reviewed.      Assessment:       1. Contusion of right knee, subsequent encounter    2. Baker's cyst of knee, right    3. Contusion of lower back and pelvis, subsequent encounter    4. Sprain and strain of left wrist    5. Contusion of left chest wall, subsequent encounter    6. Contusion of ribs, right, subsequent encounter        Plan:         Medications Ordered This Encounter   Medications    ibuprofen (ADVIL,MOTRIN) 800 MG tablet     Sig: Take 1 tablet (800 mg total) by mouth 3 (three) times daily.     Dispense:  30 tablet     Refill:  0     Patient Instructions: Daily home exercises/warm soaks, Referral to specialist to be scheduled, once authorized(Discussed results of MRI of right knee which revealed Baker's cyst.)   Restrictions: Avoid climbing/kneeling/squatting, Sit or stand as needed  Follow-up in about 2 weeks (around 3/21/2019).

## 2019-03-18 ENCOUNTER — PROCEDURE VISIT (OUTPATIENT)
Dept: NEUROLOGY | Facility: CLINIC | Age: 44
End: 2019-03-18
Payer: COMMERCIAL

## 2019-03-18 DIAGNOSIS — M25.522 LEFT ELBOW PAIN: ICD-10-CM

## 2019-03-18 PROCEDURE — 95912 PR NERVE CONDUCTION STUDY; 11 -12 STUDIES: ICD-10-PCS | Mod: S$GLB,,, | Performed by: PSYCHIATRY & NEUROLOGY

## 2019-03-18 PROCEDURE — 95886 PR EMG COMPLETE, W/ NERVE CONDUCTION STUDIES, 5+ MUSCLES: ICD-10-PCS | Mod: S$GLB,,, | Performed by: PSYCHIATRY & NEUROLOGY

## 2019-03-18 PROCEDURE — 95912 NRV CNDJ TEST 11-12 STUDIES: CPT | Mod: S$GLB,,, | Performed by: PSYCHIATRY & NEUROLOGY

## 2019-03-18 PROCEDURE — 95886 MUSC TEST DONE W/N TEST COMP: CPT | Mod: S$GLB,,, | Performed by: PSYCHIATRY & NEUROLOGY

## 2019-03-21 ENCOUNTER — OFFICE VISIT (OUTPATIENT)
Dept: URGENT CARE | Facility: CLINIC | Age: 44
End: 2019-03-21
Payer: COMMERCIAL

## 2019-03-21 DIAGNOSIS — S30.0XXD CONTUSION OF LOWER BACK AND PELVIS, SUBSEQUENT ENCOUNTER: ICD-10-CM

## 2019-03-21 DIAGNOSIS — S66.912A SPRAIN AND STRAIN OF LEFT WRIST: ICD-10-CM

## 2019-03-21 DIAGNOSIS — S80.01XD CONTUSION OF RIGHT KNEE, SUBSEQUENT ENCOUNTER: Primary | ICD-10-CM

## 2019-03-21 DIAGNOSIS — S20.212D CONTUSION OF LEFT CHEST WALL, SUBSEQUENT ENCOUNTER: ICD-10-CM

## 2019-03-21 DIAGNOSIS — M71.21 BAKER'S CYST, RIGHT: ICD-10-CM

## 2019-03-21 DIAGNOSIS — S63.502A SPRAIN AND STRAIN OF LEFT WRIST: ICD-10-CM

## 2019-03-21 PROCEDURE — 99214 OFFICE O/P EST MOD 30 MIN: CPT | Mod: S$GLB,,, | Performed by: PREVENTIVE MEDICINE

## 2019-03-21 PROCEDURE — 99214 PR OFFICE/OUTPT VISIT, EST, LEVL IV, 30-39 MIN: ICD-10-PCS | Mod: S$GLB,,, | Performed by: PREVENTIVE MEDICINE

## 2019-03-21 NOTE — LETTER
[facility] [address] [place] [address]  Phone: [phone]  Fax: [phone]  [facility]: [id number]    Pt Name: Jane Carrasquillo  Injury Date: 01/05/2019   Employee ID:  Date of Treatment: 03/21/2019   Company: [employer]      Appointment Time: 11:15 AM Arrived: 11:10AM   Provider: Spike Salmon MD Time Out: 12:10PM     Office Treatment:   EXAM  ORTHO TO BE SCHEDULED   REGULAR DUTY    1. Contusion of right knee, subsequent encounter    2. Contusion of lower back and pelvis, subsequent encounter    3. Sprain and strain of left wrist    4. Baker's cyst, right    5. Contusion of left chest wall, subsequent encounter          Patient Instructions: Daily home exercises/warm soaks(Await referral of orthopedist for right knee Baker's cyst)      Restrictions: Regular Duty     Return Appointment: 4/2/2019 at 10:00AM

## 2019-03-21 NOTE — PROGRESS NOTES
Subjective:       Patient ID: Jane Carrasquillo is a 44 y.o. female.    Chief Complaint: Knee Pain (01/05/19)    Pt returned to the clinic today for a follow up visit for right knee pain. Pt states her injury has improved. 1/10 pain and she is still taking her ibuprofen as needed. IJ      Knee Pain    The incident occurred more than 1 week ago. The incident occurred at work. The pain is present in the right knee. The pain is at a severity of 1/10. The patient is experiencing no pain. The pain has been improving since onset. She reports no foreign bodies present. Nothing aggravates the symptoms. Treatments tried: IBUPROFEN. The treatment provided mild relief.     Review of Systems   Constitution: Negative for chills and fever.   HENT: Negative for sore throat.    Eyes: Negative for blurred vision.   Cardiovascular: Negative for chest pain.   Respiratory: Negative for shortness of breath.    Skin: Negative for rash.   Musculoskeletal: Positive for joint pain. Negative for back pain.   Gastrointestinal: Negative for abdominal pain, diarrhea, nausea and vomiting.   Neurological: Negative for headaches.   Psychiatric/Behavioral: The patient is not nervous/anxious.    All other systems reviewed and are negative.      Objective:      Physical Exam   Constitutional: She appears well-developed and well-nourished.   HENT:   Head: Normocephalic and atraumatic.   Eyes: EOM are normal. Pupils are equal, round, and reactive to light.   Neck: Normal range of motion. Neck supple.   Cardiovascular: Normal rate and regular rhythm.   Pulmonary/Chest: Effort normal and breath sounds normal.   There is pain to palpation of the right rather lateral rib region about the axillary component of right shoulder.  There is no swelling ecchymosis or abrasion noted about the right lateral ribs.  Full range of motion of the chest wall with no pain. Lungs are clear to auscultation and percussion throughout.    There is mild pain with palpation of  the left anterior chest wall.  No swelling or ecchymosis noted about the chest wall or breast region.  Lungs are clear to auscultation in this region as well.   Abdominal: Soft. Bowel sounds are normal. There is no tenderness. No hernia.   Ecchymosis noted about left lower abdomen with pain on palpation. No swelling or erythema noted.No rebound and no hernia.  Bowel sounds active   Musculoskeletal:        Right knee: She exhibits normal range of motion, no swelling, no effusion, no ecchymosis, no deformity, no laceration, no erythema, normal alignment and no LCL laxity. No tenderness found. No medial joint line, no lateral joint line, no MCL, no LCL and no patellar tendon tenderness noted.        Lumbar back: She exhibits decreased range of motion, tenderness and pain. She exhibits no bony tenderness, no swelling, no edema, no deformity, no laceration, no spasm and normal pulse.        Legs:  Mild swelling with ecchymosis noted about left low back and buttock.  There is pain with palpation of the low back on the left.  There is pain with forward flexion to 90°, extension to 25° and lateral bending to the right and left to 45°.  Patient has full range of motion of the thoracic spine with no pain. There are no motor or sensory deficits about the lower extremities.    There is no swelling with palpation of the anterior aspect of the right knee.  No joint line effusion noted. Patient has no pain with full flexion and extension of the right knee.  Anterior posterior drawer signs are negative.  Bo sign is negative.    There is no swelling about the left wrist dorsal surface.  There is no pain with palpation throughout the left wrist both palmar and dorsal aspects.  Pulses intact. Patient complains of pain with full flexion extension and rotation of the left wrist.  There are no motor or sensory deficits about the right hand and wrist.   Neurological: She is alert.   No focal neurologic deficits   Skin: Skin is warm.    Psychiatric: She has a normal mood and affect.   Nursing note and vitals reviewed.      Assessment:       1. Contusion of right knee, subsequent encounter    2. Contusion of lower back and pelvis, subsequent encounter    3. Sprain and strain of left wrist    4. Baker's cyst, right    5. Contusion of left chest wall, subsequent encounter        Plan:            Patient Instructions: Daily home exercises/warm soaks(Await referral of orthopedist for right knee Baker's cyst)   Restrictions: Regular Duty  Follow-up in about 12 days (around 4/2/2019).

## 2019-04-02 ENCOUNTER — OFFICE VISIT (OUTPATIENT)
Dept: URGENT CARE | Facility: CLINIC | Age: 44
End: 2019-04-02
Payer: COMMERCIAL

## 2019-04-02 DIAGNOSIS — S63.502A SPRAIN AND STRAIN OF LEFT WRIST: ICD-10-CM

## 2019-04-02 DIAGNOSIS — M71.21 BAKER'S CYST, RIGHT: ICD-10-CM

## 2019-04-02 DIAGNOSIS — S66.912A SPRAIN AND STRAIN OF LEFT WRIST: ICD-10-CM

## 2019-04-02 DIAGNOSIS — S30.0XXD CONTUSION OF LOWER BACK AND PELVIS, SUBSEQUENT ENCOUNTER: ICD-10-CM

## 2019-04-02 DIAGNOSIS — S20.212D CONTUSION OF LEFT CHEST WALL, SUBSEQUENT ENCOUNTER: ICD-10-CM

## 2019-04-02 DIAGNOSIS — S20.211D CONTUSION OF RIBS, RIGHT, SUBSEQUENT ENCOUNTER: ICD-10-CM

## 2019-04-02 DIAGNOSIS — S80.01XD CONTUSION OF RIGHT KNEE, SUBSEQUENT ENCOUNTER: Primary | ICD-10-CM

## 2019-04-02 PROCEDURE — 99214 PR OFFICE/OUTPT VISIT, EST, LEVL IV, 30-39 MIN: ICD-10-PCS | Mod: S$GLB,,, | Performed by: PREVENTIVE MEDICINE

## 2019-04-02 PROCEDURE — 99214 OFFICE O/P EST MOD 30 MIN: CPT | Mod: S$GLB,,, | Performed by: PREVENTIVE MEDICINE

## 2019-04-02 NOTE — PROGRESS NOTES
Subjective:       Patient ID: Jane Carrasquillo is a 44 y.o. female.    Chief Complaint: Knee Pain (RIGHT 1/5/19)    Pt returned to the clinic today for a follow up visit for right knee pain. Pt states her injury has improved. 0/10 pain and she is still taking her ibuprofen as needed. IJ    Knee Pain    The incident occurred more than 1 week ago. The incident occurred at work. The pain is present in the right knee. The pain is at a severity of 0/10. The patient is experiencing no pain. The pain has been improving since onset. She reports no foreign bodies present. Nothing aggravates the symptoms. She has tried NSAIDs (IBUPROFEN) for the symptoms. The treatment provided mild relief.     Review of Systems   Constitution: Negative for chills and fever.   HENT: Negative for sore throat.    Eyes: Negative for blurred vision.   Cardiovascular: Negative for chest pain.   Respiratory: Negative for shortness of breath.    Skin: Negative for rash.   Musculoskeletal: Negative for back pain and joint pain.   Gastrointestinal: Negative for abdominal pain, diarrhea, nausea and vomiting.   Neurological: Negative for headaches.   Psychiatric/Behavioral: The patient is not nervous/anxious.    All other systems reviewed and are negative.      Objective:      Physical Exam   Constitutional: She appears well-developed and well-nourished.   HENT:   Head: Normocephalic and atraumatic.   Eyes: Pupils are equal, round, and reactive to light. EOM are normal.   Neck: Normal range of motion. Neck supple.   Cardiovascular: Normal rate and regular rhythm.   Pulmonary/Chest: Effort normal and breath sounds normal.   There is pain to palpation of the right rather lateral rib region about the axillary component of right shoulder.  There is no swelling ecchymosis or abrasion noted about the right lateral ribs.  Full range of motion of the chest wall with no pain. Lungs are clear to auscultation and percussion throughout.    There is mild pain with  palpation of the left anterior chest wall.  No swelling or ecchymosis noted about the chest wall or breast region.  Lungs are clear to auscultation in this region as well.   Abdominal: Soft. Bowel sounds are normal. No hernia.   Ecchymosis noted about left lower abdomen with pain on palpation. No swelling or erythema noted.No rebound and no hernia.  Bowel sounds active   Musculoskeletal:        Right knee: She exhibits normal range of motion, no swelling, no effusion, no ecchymosis, no deformity, no laceration, no erythema, normal alignment and no LCL laxity. No tenderness found. No medial joint line, no lateral joint line, no MCL, no LCL and no patellar tendon tenderness noted.        Lumbar back: She exhibits decreased range of motion, tenderness and pain. She exhibits no bony tenderness, no swelling, no edema, no deformity, no laceration, no spasm and normal pulse.        Legs:  No pain about low back with full range of motion of back and no pain.    There is no swelling with palpation of the anterior aspect of the right knee.  No joint line effusion noted. Patient has no pain with full flexion and extension of the right knee.  Anterior posterior drawer signs are negative.  Bo sign is negative.    There is no swelling about the left wrist dorsal surface.  There is no pain with palpation throughout the left wrist both palmar and dorsal aspects.  Pulses intact. Patient complains of pain with full flexion extension and rotation of the left wrist.  There are no motor or sensory deficits about the right hand and wrist.   Neurological: She is alert.   No focal neurologic deficits   Skin: Skin is warm.   Psychiatric: She has a normal mood and affect.   Nursing note and vitals reviewed.      Assessment:       1. Contusion of right knee, subsequent encounter    2. Contusion of lower back and pelvis, subsequent encounter    3. Sprain and strain of left wrist    4. Baker's cyst, right    5. Contusion of left chest  wall, subsequent encounter    6. Contusion of ribs, right, subsequent encounter        Plan:            Patient Instructions: Daily home exercises/warm soaks(Continue Ibuprofen for right knee pain as needed.)   Restrictions: Regular Duty  Follow up in about 3 weeks (around 4/23/2019).

## 2019-04-02 NOTE — LETTER
[facility] [address] [place] [address]  Phone: [phone]  Fax: [phone]  [facility]: [id number]    Pt Name: Jane Carrasquillo  Injury Date: 01/05/2019   Employee ID:  Date of Treatment: 04/02/2019   Company: [employer]      Appointment Time: 09:45 AM Arrived: 11:56AM   Provider: Spike Salmon MD Time Out: 1:25PM     Office Treatment:   EXAM  REGULAR DUTY    1. Contusion of right knee, subsequent encounter    2. Contusion of lower back and pelvis, subsequent encounter    3. Sprain and strain of left wrist    4. Baker's cyst, right    5. Contusion of left chest wall, subsequent encounter    6. Contusion of ribs, right, subsequent encounter          Patient Instructions: Daily home exercises/warm soaks(Continue Ibuprofen for right knee pain as needed.)      Restrictions: Regular Duty     Return Appointment: 4/23/2019 at 10:00AM

## 2019-04-16 ENCOUNTER — OFFICE VISIT (OUTPATIENT)
Dept: ORTHOPEDICS | Facility: CLINIC | Age: 44
End: 2019-04-16
Payer: COMMERCIAL

## 2019-04-16 VITALS — WEIGHT: 208 LBS | HEIGHT: 66 IN | BODY MASS INDEX: 33.43 KG/M2

## 2019-04-16 DIAGNOSIS — M75.22 BICEPS TENDINITIS OF LEFT UPPER EXTREMITY: Primary | ICD-10-CM

## 2019-04-16 PROCEDURE — 99213 PR OFFICE/OUTPT VISIT, EST, LEVL III, 20-29 MIN: ICD-10-PCS | Mod: S$GLB,,, | Performed by: ORTHOPAEDIC SURGERY

## 2019-04-16 PROCEDURE — 99999 PR PBB SHADOW E&M-EST. PATIENT-LVL III: CPT | Mod: PBBFAC,,, | Performed by: ORTHOPAEDIC SURGERY

## 2019-04-16 PROCEDURE — 99999 PR PBB SHADOW E&M-EST. PATIENT-LVL III: ICD-10-PCS | Mod: PBBFAC,,, | Performed by: ORTHOPAEDIC SURGERY

## 2019-04-16 PROCEDURE — 99213 OFFICE O/P EST LOW 20 MIN: CPT | Mod: S$GLB,,, | Performed by: ORTHOPAEDIC SURGERY

## 2019-04-16 RX ORDER — IBUPROFEN 800 MG/1
800 TABLET ORAL
Qty: 90 TABLET | Refills: 1 | Status: SHIPPED | OUTPATIENT
Start: 2019-04-16 | End: 2019-04-30

## 2019-04-16 NOTE — PROGRESS NOTES
HISTORY OF PRESENT ILLNESS:  Ms. Carrasquillo in followup of left elbow and arm   pain.  She reports that her symptoms are about the same.  She recently had a   nerve conduction study of the left arm, which was normal.  I went over that with   her today.  She continues to have pain, which radiates up and down the left   arm, but does seem to originate in the left elbow.    Previous MRI showed some tendonitis around the biceps insertion.    In terms of treatment, she has already been through physical therapy and does   not want to do that again.  She does take anti-inflammatory medication by mouth   and has been on light duty at work at various times.    She is currently on full duty and does not want go back to light duty work.    PHYSICAL EXAMINATION:   EXTREMITIES:  Today, the left elbow is a little bit tender over the biceps.    There is no swelling.  She has full range of motion, good strength and full   supination.  NEUROLOGIC:  Intact.    IMPRESSION:  Chronic tendonitis, left biceps and elbow.    PLAN:  Unfortunately, there is not a good answer for her and I explained that to   her today.  I do not think surgery is indicated and although therapy might be   helpful, she has already been through that before.    I would like her to stay on anti-inflammatory medication by mouth.    In terms of work, I think putting her on a modified work program would probably   be helpful, but she is not interested in that and does not feel like she needs   that at the present time.    I also brought up the idea of pain management program and she does not feel like   she needs that either.    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  She may   have ongoing symptoms in the future and of course if symptoms get worse, I would   like to see her back in followup.  Full duty work, no restrictions unless   symptoms worsen.      MYRTLE/IN  dd: 04/16/2019 11:39:01 (CDT)  td: 04/17/2019 07:17:12 (CDT)  Doc ID   #[id number]   Job ID #[id number]    CC:

## 2019-04-30 ENCOUNTER — OFFICE VISIT (OUTPATIENT)
Dept: URGENT CARE | Facility: CLINIC | Age: 44
End: 2019-04-30
Payer: COMMERCIAL

## 2019-04-30 DIAGNOSIS — M71.21 BAKER'S CYST, RIGHT: ICD-10-CM

## 2019-04-30 DIAGNOSIS — S63.502A SPRAIN AND STRAIN OF LEFT WRIST: ICD-10-CM

## 2019-04-30 DIAGNOSIS — S80.01XD CONTUSION OF RIGHT KNEE, SUBSEQUENT ENCOUNTER: Primary | ICD-10-CM

## 2019-04-30 DIAGNOSIS — S20.212D CONTUSION OF LEFT CHEST WALL, SUBSEQUENT ENCOUNTER: ICD-10-CM

## 2019-04-30 DIAGNOSIS — S20.211D CONTUSION OF RIBS, RIGHT, SUBSEQUENT ENCOUNTER: ICD-10-CM

## 2019-04-30 DIAGNOSIS — S66.912A SPRAIN AND STRAIN OF LEFT WRIST: ICD-10-CM

## 2019-04-30 DIAGNOSIS — S30.0XXD CONTUSION OF LOWER BACK AND PELVIS, SUBSEQUENT ENCOUNTER: ICD-10-CM

## 2019-04-30 PROCEDURE — 99214 OFFICE O/P EST MOD 30 MIN: CPT | Mod: S$GLB,,, | Performed by: PREVENTIVE MEDICINE

## 2019-04-30 PROCEDURE — 99214 PR OFFICE/OUTPT VISIT, EST, LEVL IV, 30-39 MIN: ICD-10-PCS | Mod: S$GLB,,, | Performed by: PREVENTIVE MEDICINE

## 2019-04-30 RX ORDER — DICLOFENAC SODIUM 75 MG/1
75 TABLET, DELAYED RELEASE ORAL 2 TIMES DAILY WITH MEALS
Qty: 60 TABLET | Refills: 2 | Status: SHIPPED | OUTPATIENT
Start: 2019-04-30 | End: 2019-09-27

## 2019-04-30 NOTE — LETTER
[facility] [address] [place] [address]  Phone: [phone]  Fax: [phone]  [facility]: [id number]    Pt Name: Jnae Carrasquillo  Injury Date: 01/05/2019   Employee ID:  Date of Treatment: 04/30/2019   Company: [employer]      Appointment Time: 10:45 AM Arrived: 1055 AM   Provider: Spike Salmon MD Time Out:1215 PM      Office Treatment:   EXAM  KNEE BRACE  REFERRAL TO ORTHO ONCE APPROVAL  REGULAR DUTY    1. Contusion of right knee, subsequent encounter    2. Contusion of lower back and pelvis, subsequent encounter    3. Sprain and strain of left wrist    4. Baker's cyst, right    5. Contusion of left chest wall, subsequent encounter    6. Contusion of ribs, right, subsequent encounter      Medications Ordered This Encounter   Medications    diclofenac (VOLTAREN) 75 MG EC tablet      Patient Instructions: Daily home exercises/warm soaks(Await referral to orhtopedics for chronic right knee pain)      Restrictions: Regular Duty     Return Appointment: 5/21/2019 at 0950 AM

## 2019-04-30 NOTE — PROGRESS NOTES
Subjective:       Patient ID: Jane Carrasquillo is a 44 y.o. female.    Chief Complaint: Knee Injury and Back Pain    Pt returned to the clinic today for a follow up visit for right knee pain AND lower back from 01/05/19.  CT    Knee Injury   Pertinent negatives include no abdominal pain, chest pain, chills, fever, headaches, nausea, rash, sore throat or vomiting.   Back Pain   Pertinent negatives include no abdominal pain, chest pain, fever or headaches.   Knee Pain    The incident occurred more than 1 week ago. The incident occurred at work. The pain is present in the right knee. The pain is at a severity of 0/10. The patient is experiencing no pain. The pain has been improving since onset. She reports no foreign bodies present. Nothing aggravates the symptoms. She has tried NSAIDs (IBUPROFEN) for the symptoms. The treatment provided mild relief.     Review of Systems   Constitution: Negative for chills and fever.   HENT: Negative for sore throat.    Eyes: Negative for blurred vision.   Cardiovascular: Negative for chest pain.   Respiratory: Negative for shortness of breath.    Skin: Negative for rash.   Musculoskeletal: Negative for back pain and joint pain.   Gastrointestinal: Negative for abdominal pain, diarrhea, nausea and vomiting.   Neurological: Negative for headaches.   Psychiatric/Behavioral: The patient is not nervous/anxious.    All other systems reviewed and are negative.      Objective:      Physical Exam   Constitutional: She appears well-developed and well-nourished.   HENT:   Head: Normocephalic and atraumatic.   Eyes: Pupils are equal, round, and reactive to light. EOM are normal.   Neck: Normal range of motion. Neck supple.   Cardiovascular: Normal rate and regular rhythm.   Pulmonary/Chest: Effort normal and breath sounds normal.   There is pain to palpation of the right rather lateral rib region about the axillary component of right shoulder.  There is no swelling ecchymosis or abrasion noted  about the right lateral ribs.  Full range of motion of the chest wall with no pain. Lungs are clear to auscultation and percussion throughout.    There is mild pain with palpation of the left anterior chest wall.  No swelling or ecchymosis noted about the chest wall or breast region.  Lungs are clear to auscultation in this region as well.   Abdominal: Soft. Bowel sounds are normal. No hernia.   Ecchymosis noted about left lower abdomen with pain on palpation. No swelling or erythema noted.No rebound and no hernia.  Bowel sounds active   Musculoskeletal:        Right knee: She exhibits normal range of motion, no swelling, no effusion, no ecchymosis, no deformity, no laceration, no erythema, normal alignment and no LCL laxity. No tenderness found. No medial joint line, no lateral joint line, no MCL, no LCL and no patellar tendon tenderness noted.        Lumbar back: She exhibits decreased range of motion, tenderness and pain. She exhibits no bony tenderness, no swelling, no edema, no deformity, no laceration, no spasm and normal pulse.        Legs:  No pain about low back with full range of motion of back and no pain.    There is no swelling with palpation of the anterior aspect of the right knee.  No joint line effusion noted. Patient has no pain with full flexion and extension of the right knee.  Anterior posterior drawer signs are negative.  Bo sign is negative.    There is no swelling about the left wrist dorsal surface.  There is no pain with palpation throughout the left wrist both palmar and dorsal aspects.  Pulses intact. Patient complains of pain with full flexion extension and rotation of the left wrist.  There are no motor or sensory deficits about the right hand and wrist.   Neurological: She is alert.   No focal neurologic deficits   Skin: Skin is warm.   Psychiatric: She has a normal mood and affect.   Nursing note and vitals reviewed.      Assessment:       1. Contusion of right knee, subsequent  encounter    2. Contusion of lower back and pelvis, subsequent encounter    3. Sprain and strain of left wrist    4. Baker's cyst, right    5. Contusion of left chest wall, subsequent encounter    6. Contusion of ribs, right, subsequent encounter        Plan:         Medications Ordered This Encounter   Medications    diclofenac (VOLTAREN) 75 MG EC tablet     Sig: Take 1 tablet (75 mg total) by mouth 2 (two) times daily with meals.     Dispense:  60 tablet     Refill:  2     Patient Instructions: Daily home exercises/warm soaks(Await referral to orhtopedics for chronic right knee pain)   Restrictions: Regular Duty  Follow up in about 3 weeks (around 5/21/2019).

## 2019-07-09 ENCOUNTER — OFFICE VISIT (OUTPATIENT)
Dept: URGENT CARE | Facility: CLINIC | Age: 44
End: 2019-07-09
Payer: COMMERCIAL

## 2019-07-09 VITALS
BODY MASS INDEX: 31.98 KG/M2 | OXYGEN SATURATION: 99 % | DIASTOLIC BLOOD PRESSURE: 70 MMHG | SYSTOLIC BLOOD PRESSURE: 150 MMHG | HEART RATE: 65 BPM | RESPIRATION RATE: 18 BRPM | WEIGHT: 199 LBS | TEMPERATURE: 98 F | HEIGHT: 66 IN

## 2019-07-09 DIAGNOSIS — J06.9 URI, ACUTE: ICD-10-CM

## 2019-07-09 DIAGNOSIS — J30.1 SEASONAL ALLERGIC RHINITIS DUE TO POLLEN: Primary | ICD-10-CM

## 2019-07-09 PROCEDURE — 99213 PR OFFICE/OUTPT VISIT, EST, LEVL III, 20-29 MIN: ICD-10-PCS | Mod: 25,S$GLB,, | Performed by: FAMILY MEDICINE

## 2019-07-09 PROCEDURE — 3008F PR BODY MASS INDEX (BMI) DOCUMENTED: ICD-10-PCS | Mod: CPTII,S$GLB,, | Performed by: FAMILY MEDICINE

## 2019-07-09 PROCEDURE — 96372 PR INJECTION,THERAP/PROPH/DIAG2ST, IM OR SUBCUT: ICD-10-PCS | Mod: S$GLB,,, | Performed by: FAMILY MEDICINE

## 2019-07-09 PROCEDURE — 96372 THER/PROPH/DIAG INJ SC/IM: CPT | Mod: S$GLB,,, | Performed by: FAMILY MEDICINE

## 2019-07-09 PROCEDURE — 3008F BODY MASS INDEX DOCD: CPT | Mod: CPTII,S$GLB,, | Performed by: FAMILY MEDICINE

## 2019-07-09 PROCEDURE — 99213 OFFICE O/P EST LOW 20 MIN: CPT | Mod: 25,S$GLB,, | Performed by: FAMILY MEDICINE

## 2019-07-09 RX ORDER — DEXAMETHASONE SODIUM PHOSPHATE 100 MG/10ML
10 INJECTION INTRAMUSCULAR; INTRAVENOUS
Status: COMPLETED | OUTPATIENT
Start: 2019-07-09 | End: 2019-07-09

## 2019-07-09 RX ADMIN — DEXAMETHASONE SODIUM PHOSPHATE 10 MG: 100 INJECTION INTRAMUSCULAR; INTRAVENOUS at 07:07

## 2019-07-09 NOTE — PROGRESS NOTES
"Subjective:       Patient ID: Jane Carrasquillo is a 44 y.o. female.    Vitals:  height is 5' 6" (1.676 m) and weight is 90.3 kg (199 lb). Her temperature is 98.4 °F (36.9 °C). Her blood pressure is 150/70 (abnormal) and her pulse is 65. Her respiration is 18 and oxygen saturation is 99%.     Chief Complaint: Sinus Problem    C/o severe sinus congestion, post nasal drip and facial pressure x few days, no relief with zyrtec d, montelukest and nasal spray, no fever    Sinus Problem   This is a new problem. The current episode started yesterday. The problem has been gradually worsening since onset. Her pain is at a severity of 5/10. The pain is moderate. Associated symptoms include chills, congestion, coughing, diaphoresis, ear pain, headaches, sinus pressure, sneezing and a sore throat. Pertinent negatives include no shortness of breath. Treatments tried: tumeric, Zinc, Vitamin B and Vitamin D. The treatment provided moderate relief.       Constitution: Positive for chills, sweating, fatigue and fever.   HENT: Positive for ear pain, congestion, postnasal drip, sinus pain, sinus pressure and sore throat. Negative for voice change.    Neck: Negative for painful lymph nodes.   Eyes: Negative for eye redness.   Respiratory: Positive for cough. Negative for chest tightness, sputum production, bloody sputum, COPD, shortness of breath, stridor, wheezing and asthma.    Gastrointestinal: Negative for nausea and vomiting.   Musculoskeletal: Negative for muscle ache.   Skin: Negative for rash.   Allergic/Immunologic: Positive for sneezing. Negative for seasonal allergies and asthma.   Neurological: Positive for headaches.   Hematologic/Lymphatic: Negative for swollen lymph nodes.       Objective:      Physical Exam   Constitutional: She is oriented to person, place, and time. She appears well-developed and well-nourished. She is cooperative.  Non-toxic appearance. She does not appear ill. No distress.   HENT:   Head: " Normocephalic and atraumatic.   Right Ear: Hearing, tympanic membrane, external ear and ear canal normal.   Left Ear: Hearing, tympanic membrane, external ear and ear canal normal.   Nose: Nose normal. No mucosal edema, rhinorrhea or nasal deformity. No epistaxis. Right sinus exhibits no maxillary sinus tenderness and no frontal sinus tenderness. Left sinus exhibits no maxillary sinus tenderness and no frontal sinus tenderness.   Mouth/Throat: Uvula is midline, oropharynx is clear and moist and mucous membranes are normal. No trismus in the jaw. Normal dentition. No uvula swelling. No posterior oropharyngeal erythema.   Edematous nasal mucosa, tms with fluid, no erythema   Eyes: Conjunctivae and lids are normal. Right eye exhibits no discharge. Left eye exhibits no discharge. No scleral icterus.   Sclera clear bilat   Neck: Trachea normal, normal range of motion, full passive range of motion without pain and phonation normal. Neck supple.   Cardiovascular: Normal rate, regular rhythm, normal heart sounds, intact distal pulses and normal pulses. Exam reveals no gallop and no friction rub.   No murmur heard.  Pulmonary/Chest: Effort normal and breath sounds normal. No stridor. No respiratory distress. She has no wheezes. She has no rales. She exhibits no tenderness.   Abdominal: Soft. Normal appearance and bowel sounds are normal. She exhibits no distension, no pulsatile midline mass and no mass. There is no tenderness.   Musculoskeletal: Normal range of motion. She exhibits no edema or deformity.   Neurological: She is alert and oriented to person, place, and time. She exhibits normal muscle tone. Coordination normal.   Skin: Skin is warm, dry and intact. She is not diaphoretic. No pallor.   Psychiatric: She has a normal mood and affect. Her speech is normal and behavior is normal. Judgment and thought content normal. Cognition and memory are normal.   Nursing note and vitals reviewed.      Assessment:       1.  Seasonal allergic rhinitis due to pollen    2. URI, acute        Plan:         Seasonal allergic rhinitis due to pollen  -     dexamethasone injection 10 mg    URI, acute  -     dexamethasone injection 10 mg        Cont. Zyrtec d one bid  flonase 1-2 sprays once daily    Cont montelukest

## 2019-07-09 NOTE — LETTER
July 9, 2019      [facility]  [address] [place] [address]  Phone: [phone]  Fax: [phone]       Patient: Jane Carrasquillo   YOB: 1975  Date of Visit: 07/09/2019    To Whom It May Concern:    Virginia Carrasquillo  was at [facility] on 07/09/2019. She may return to work/school on 07/10/2019 with no restrictions. If you have any questions or concerns, or if I can be of further assistance, please do not hesitate to contact me.    Sincerely,    Hoa Genao MA

## 2019-07-09 NOTE — LETTER
July 9, 2019      [facility]  [place] [address]  Phone: [phone]  Fax: [phone]       Patient: Jane Carrasquillo   YOB: 1975  Date of Visit: 07/09/2019    To Whom It May Concern:    Virginia Carrasquillo  was at [facility] on 07/09/2019. She may return to work/school on 07/11/2019 with no restrictions. If you have any questions or concerns, or if I can be of further assistance, please do not hesitate to contact me.    Sincerely,    Hoa Genao MA

## 2019-09-07 ENCOUNTER — OFFICE VISIT (OUTPATIENT)
Dept: URGENT CARE | Facility: CLINIC | Age: 44
End: 2019-09-07
Payer: COMMERCIAL

## 2019-09-07 VITALS
SYSTOLIC BLOOD PRESSURE: 125 MMHG | WEIGHT: 195 LBS | OXYGEN SATURATION: 97 % | HEART RATE: 68 BPM | HEIGHT: 66 IN | RESPIRATION RATE: 20 BRPM | TEMPERATURE: 98 F | DIASTOLIC BLOOD PRESSURE: 74 MMHG | BODY MASS INDEX: 31.34 KG/M2

## 2019-09-07 DIAGNOSIS — M77.8 LEFT ELBOW TENDONITIS: Primary | ICD-10-CM

## 2019-09-07 PROCEDURE — 99204 OFFICE O/P NEW MOD 45 MIN: CPT | Mod: S$GLB,,, | Performed by: FAMILY MEDICINE

## 2019-09-07 PROCEDURE — 99204 PR OFFICE/OUTPT VISIT, NEW, LEVL IV, 45-59 MIN: ICD-10-PCS | Mod: S$GLB,,, | Performed by: FAMILY MEDICINE

## 2019-09-07 PROCEDURE — 96372 THER/PROPH/DIAG INJ SC/IM: CPT | Mod: S$GLB,,, | Performed by: FAMILY MEDICINE

## 2019-09-07 PROCEDURE — 96372 PR INJECTION,THERAP/PROPH/DIAG2ST, IM OR SUBCUT: ICD-10-PCS | Mod: S$GLB,,, | Performed by: FAMILY MEDICINE

## 2019-09-07 RX ORDER — KETOROLAC TROMETHAMINE 30 MG/ML
60 INJECTION, SOLUTION INTRAMUSCULAR; INTRAVENOUS
Status: COMPLETED | OUTPATIENT
Start: 2019-09-07 | End: 2019-09-07

## 2019-09-07 RX ORDER — DICLOFENAC SODIUM 75 MG/1
75 TABLET, DELAYED RELEASE ORAL 2 TIMES DAILY
Qty: 30 TABLET | Refills: 1 | Status: SHIPPED | OUTPATIENT
Start: 2019-09-07 | End: 2019-09-27

## 2019-09-07 RX ADMIN — KETOROLAC TROMETHAMINE 60 MG: 30 INJECTION, SOLUTION INTRAMUSCULAR; INTRAVENOUS at 03:09

## 2019-09-07 NOTE — PATIENT INSTRUCTIONS
Elbow Extension (Flexibility)    These instructions are for your right elbow. Switch sides for your left elbow.  1. Lie on your back on a bed, next to the edge. Let your right forearm and hand hang off the bed relaxed, palm up. Only your upper arm should be on the bed.  2. Gently straighten your arm fully until you feel a stretch in the elbow. Keep your hand relaxed.  3. Hold for 30 to 60 seconds. Then relax your arm.  4. Repeat  2 times.  5. Repeat this exercise 3 times a day, or as instructed.  Date Last Reviewed: 3/10/2016  © 7342-7624 [employer]. [address] [place] [address], [place] [address]. All rights reserved. This information is not intended as a substitute for professional medical care. Always follow your healthcare professional's instructions.

## 2019-09-07 NOTE — PROGRESS NOTES
Subjective:       Patient ID: Jane Carrasquillo is a 44 y.o. female.    Chief Complaint: Arm Pain (recurrent pain from previous injury 7/22/2018)    Patient states that she is having recurrent pain from an lnjury sustained at work approx 1 year ago.  She noticed the pain again on Friday 9/6/2019.  On today when helping to lift a large wheelchair/she pused it into the area of storage at that time she felt an excruciating pain.  Patient has a history of left arm/elbow injury over a year ago and was being seen in the [facility] section was referred to Ortho has had negative MRI and has been ora has received physical therapy and was released but pain has recurred since yesterday    Arm Injury    The incident occurred more than 1 week ago (Recurrent left upper extremity pain). The incident occurred at work. The injury mechanism was repetitive motion. The pain is present in the left elbow, left shoulder and left forearm (left upp extremity pain). The quality of the pain is described as shooting and burning. The pain radiates to the left hand and left arm. The pain is at a severity of 8/10. The pain is moderate. The pain has been fluctuating since the incident. Pertinent negatives include no chest pain or numbness. The symptoms are aggravated by movement. Treatments tried: voltaran. The treatment provided mild relief.     Review of Systems   Constitution: Negative for malaise/fatigue.   HENT: Negative for nosebleeds.    Cardiovascular: Negative for chest pain and syncope.   Respiratory: Negative for shortness of breath.    Musculoskeletal: Positive for stiffness. Negative for back pain, joint pain and neck pain.   Gastrointestinal: Negative for abdominal pain.   Genitourinary: Negative for hematuria.   Neurological: Negative for dizziness, numbness and weakness.       Objective:      Physical Exam   Constitutional: She is oriented to person, place, and time. She appears well-developed and well-nourished. She is cooperative.   Non-toxic appearance. She does not appear ill. No distress.   HENT:   Head: Normocephalic and atraumatic.   Right Ear: Hearing, tympanic membrane, external ear and ear canal normal.   Left Ear: Hearing, tympanic membrane, external ear and ear canal normal.   Nose: Nose normal. No mucosal edema, rhinorrhea or nasal deformity. No epistaxis. Right sinus exhibits no maxillary sinus tenderness and no frontal sinus tenderness. Left sinus exhibits no maxillary sinus tenderness and no frontal sinus tenderness.   Mouth/Throat: Uvula is midline, oropharynx is clear and moist and mucous membranes are normal. No trismus in the jaw. Normal dentition. No uvula swelling. No posterior oropharyngeal erythema.   Eyes: Conjunctivae and lids are normal. Right eye exhibits no discharge. Left eye exhibits no discharge. No scleral icterus.   Sclera clear bilat   Neck: Trachea normal, normal range of motion, full passive range of motion without pain and phonation normal. Neck supple.   Cardiovascular: Normal rate, regular rhythm, normal heart sounds, intact distal pulses and normal pulses.   Pulmonary/Chest: Effort normal and breath sounds normal. No respiratory distress.   Abdominal: Soft. Normal appearance and bowel sounds are normal. She exhibits no distension, no pulsatile midline mass and no mass. There is no tenderness.   Musculoskeletal: Normal range of motion. She exhibits no edema or deformity.   Left forearm/elbow area lateral edge of the epicondyle with mild tenderness on deep palpation squeezing of the hand with increased pain otherwise no swelling orseen   Neurological: She is alert and oriented to person, place, and time. She exhibits normal muscle tone. Coordination normal.   Skin: Skin is warm, dry and intact. She is not diaphoretic. No pallor.   Psychiatric: She has a normal mood and affect. Her speech is normal and behavior is normal. Judgment and thought content normal. Cognition and memory are normal.   Nursing note and  vitals reviewed.      Assessment:       1. Left elbow tendonitis        Plan:         Medications Ordered This Encounter   Medications    diclofenac (VOLTAREN) 75 MG EC tablet     Sig: Take 1 tablet (75 mg total) by mouth 2 (two) times daily.     Dispense:  30 tablet     Refill:  1    ketorolac injection 60 mg        Pt advised to wear left elbow brace, follow up with [facility] med in 2 days    Cont Diclofenac  Modified duty work     No follow-ups on file.

## 2019-09-09 ENCOUNTER — OFFICE VISIT (OUTPATIENT)
Dept: URGENT CARE | Facility: CLINIC | Age: 44
End: 2019-09-09
Payer: COMMERCIAL
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
## 2019-09-09 DIAGNOSIS — M75.22 BICEPS TENDINITIS OF LEFT UPPER EXTREMITY: Primary | ICD-10-CM

## 2019-09-09 DIAGNOSIS — M79.602 LEFT ARM PAIN: ICD-10-CM

## 2019-09-09 DIAGNOSIS — M77.8 LEFT ELBOW TENDONITIS: ICD-10-CM

## 2019-09-09 PROCEDURE — 96372 PR INJECTION,THERAP/PROPH/DIAG2ST, IM OR SUBCUT: ICD-10-PCS | Mod: S$GLB,,, | Performed by: PHYSICIAN ASSISTANT

## 2019-09-09 PROCEDURE — 99214 PR OFFICE/OUTPT VISIT, EST, LEVL IV, 30-39 MIN: ICD-10-PCS | Mod: 25,S$GLB,, | Performed by: PHYSICIAN ASSISTANT

## 2019-09-09 PROCEDURE — 99214 OFFICE O/P EST MOD 30 MIN: CPT | Mod: 25,S$GLB,, | Performed by: PHYSICIAN ASSISTANT

## 2019-09-09 PROCEDURE — 20605 DRAIN/INJ JOINT/BURSA W/O US: CPT | Mod: S$GLB,,, | Performed by: PHYSICIAN ASSISTANT

## 2019-09-09 PROCEDURE — 96372 THER/PROPH/DIAG INJ SC/IM: CPT | Mod: S$GLB,,, | Performed by: PHYSICIAN ASSISTANT

## 2019-09-09 PROCEDURE — 20605 INTERMEDIATE JOINT ASPIRATION/INJECTION: L ELBOW: ICD-10-PCS | Mod: S$GLB,,, | Performed by: PHYSICIAN ASSISTANT

## 2019-09-09 RX ORDER — BETAMETHASONE SODIUM PHOSPHATE AND BETAMETHASONE ACETATE 3; 3 MG/ML; MG/ML
6 INJECTION, SUSPENSION INTRA-ARTICULAR; INTRALESIONAL; INTRAMUSCULAR; SOFT TISSUE
Status: DISCONTINUED | OUTPATIENT
Start: 2019-09-09 | End: 2019-09-09 | Stop reason: HOSPADM

## 2019-09-09 RX ADMIN — BETAMETHASONE SODIUM PHOSPHATE AND BETAMETHASONE ACETATE 6 MG: 3; 3 INJECTION, SUSPENSION INTRA-ARTICULAR; INTRALESIONAL; INTRAMUSCULAR; SOFT TISSUE at 09:09

## 2019-09-09 NOTE — PROGRESS NOTES
Subjective:       Patient ID: Jane Carrasquillo is a 44 y.o. female.    Chief Complaint: Arm Pain (L ARM/ELBOW PAIN 09/07/2019)    Pt here for follow up L ARM pain that started on 09/07/2019. 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 Pt was seen in  and was given an unknown shot that she states helped and is taking Voltaren that offers mild relief. Pt has a previous injury to same arm and denies other complaint/injury. SP    44-year-old female presents with exacerbation of left arm and elbow pain for 2 days. DANNA as above.  Patient has been dealing with left elbow and arm pain for over a year.  Patient was seen at urgent care and given a Toradol shot and diclofenac with some relief.  Pain is worse with movement and lifting activities.  She endorses occasional numbness and tingling in the left little finger.  Left elbow pain radiates from the lateral aspect distally down the forearm and proximally to the shoulder.  Patient denies neck pain.    Arm Pain    The incident occurred 3 to 5 days ago. The incident occurred at work. There was no injury mechanism. The pain is present in the left elbow and left forearm. The quality of the pain is described as aching. The pain radiates to the left hand. The pain is at a severity of 5/10. The pain is moderate. The pain has been constant since the incident. Associated symptoms include muscle weakness. Pertinent negatives include no chest pain or numbness. The symptoms are aggravated by lifting. She has tried NSAIDs for the symptoms. The treatment provided mild relief.     Review of Systems   Constitution: Negative for chills, fever and malaise/fatigue.   HENT: Negative for hearing loss and nosebleeds.    Eyes: Negative for blurred vision and redness.   Cardiovascular: Negative for chest pain and syncope.   Respiratory: Negative for cough and shortness of breath.     Hematologic/Lymphatic: Negative for bleeding problem.   Skin: Negative for color change and rash.   Musculoskeletal: Positive for joint pain, muscle weakness and stiffness. Negative for back pain, joint swelling and neck pain.   Gastrointestinal: Negative for abdominal pain.   Genitourinary: Negative for hematuria.   Neurological: Positive for paresthesias. Negative for dizziness, numbness and weakness.   Psychiatric/Behavioral: The patient is not nervous/anxious.        Objective:      Physical Exam   Constitutional: She appears well-developed and well-nourished. She is active. No distress.   HENT:   Head: Normocephalic and atraumatic.   Right Ear: Hearing and external ear normal.   Left Ear: Hearing and external ear normal.   Nose: Nose normal. No nasal deformity. No epistaxis.   Mouth/Throat: Oropharynx is clear and moist and mucous membranes are normal.   Eyes: Conjunctivae and lids are normal. No scleral icterus.   Neck: Trachea normal and normal range of motion.   Cardiovascular: Intact distal pulses and normal pulses.   Pulmonary/Chest: Effort normal. No stridor. No respiratory distress.   Musculoskeletal:        Left shoulder: She exhibits tenderness (Posterior and lateral aspect). She exhibits normal range of motion, no deformity, no pain (Amber, O'Thomas negative), normal pulse and normal strength.        Left elbow: She exhibits swelling. She exhibits normal range of motion and no deformity. Tenderness found. Lateral epicondyle tenderness noted.        Arms:  Neurological: She is alert. She has normal strength. She is not disoriented. No sensory deficit. GCS eye subscore is 4. GCS verbal subscore is 5. GCS motor subscore is 6.   Skin: Skin is warm, dry and intact. Capillary refill takes less than 2 seconds. She is not diaphoretic.   Psychiatric: She has a normal mood and affect. Her speech is normal and behavior is normal. She is attentive.   Nursing note and vitals reviewed.      Assessment:       1.  Biceps tendinitis of left upper extremity    2. Left elbow tendonitis    3. Left arm pain    4. Work related injury        Plan:           L lateral epicondyleIntermediate Joint Aspiration/Injection: L elbow  Date/Time: 9/9/2019 9:49 AM  Performed by: Óscar Guzmán PA-C  Authorized by: Óscar Guzmán PA-C     Consent Done?: Yes (Verbal)  Indications:  Pain  Site marked: The procedure site was marked    Timeout: Prior to procedure the correct patient, procedure, and site was verified      Location:  Elbow  Site:  L elbow  Prep: Patient was prepped and draped in usual sterile fashion    Ultrasonic Guidance for needle placement: No  Needle size:  25 G  Approach:  Posterolateral  Medications:  6 mg betamethasone acetate-betamethasone sodium phosphate 6 mg/mL  Patient tolerance:  Patient tolerated the procedure well with no immediate complications        Medications Ordered This Encounter   Medications    betamethasone acetate-betamethasone sodium phosphate injection 6 mg     Patient Instructions: Daily home exercises/warm soaks(Continue Voltaren as directed)   Restrictions: Limited use of left hand and arm  Follow up in about 4 days (around 9/13/2019).

## 2019-09-09 NOTE — LETTER
[facility]  [address] [place] [address]  Phone: [phone]  Fax: [phone]  [facility]: [id number]    Pt Name: Jane Carrasquillo  Injury Date: 09/07/2019   Employee ID: [id number] Date of Treatment: 09/09/2019   Company: [employer]      Appointment Time: 08:15 AM Arrived: 0830 AM   Provider: Óscar Guzmán PA-C Time Out: 1010 AM     Office Treatment:     EXAM  INJECTION INTO INJURY GIVEN  CA-17 COMPLETED/GIVEN TO PT  FOLLOW UP APPT SCHEDULED  LIGHT DUTY    1. Biceps tendinitis of left upper extremity    2. Left elbow tendonitis    3. Left arm pain    4. Work related injury      Medications Ordered This Encounter   Medications    betamethasone acetate-betamethasone sodium phosphate injection 6 mg      Patient Instructions: Daily home exercises/warm soaks(Continue Voltaren as directed)       Restrictions: Limited use of left hand and arm     Return Appointment: 9/13/2019 at 0900 AM  JAYDE

## 2019-09-09 NOTE — PROCEDURES
L lateral epicondyleIntermediate Joint Aspiration/Injection: L elbow  Date/Time: 9/9/2019 9:49 AM  Performed by: Óscar Guzmán PA-C  Authorized by: Óscar Guzmán PA-C     Consent Done?: Yes (Verbal)  Indications:  Pain  Site marked: The procedure site was marked    Timeout: Prior to procedure the correct patient, procedure, and site was verified      Location:  Elbow  Site:  L elbow  Prep: Patient was prepped and draped in usual sterile fashion    Ultrasonic Guidance for needle placement: No  Needle size:  25 G  Approach:  Posterolateral  Medications:  6 mg betamethasone acetate-betamethasone sodium phosphate 6 mg/mL  Patient tolerance:  Patient tolerated the procedure well with no immediate complications

## 2019-09-13 ENCOUNTER — OFFICE VISIT (OUTPATIENT)
Dept: URGENT CARE | Facility: CLINIC | Age: 44
End: 2019-09-13
Payer: COMMERCIAL
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
## 2019-09-13 DIAGNOSIS — M77.8 LEFT ELBOW TENDONITIS: ICD-10-CM

## 2019-09-13 DIAGNOSIS — M79.602 LEFT ARM PAIN: ICD-10-CM

## 2019-09-13 DIAGNOSIS — M75.22 BICEPS TENDINITIS OF LEFT UPPER EXTREMITY: Primary | ICD-10-CM

## 2019-09-13 PROCEDURE — 99213 OFFICE O/P EST LOW 20 MIN: CPT | Mod: S$GLB,,, | Performed by: NURSE PRACTITIONER

## 2019-09-13 PROCEDURE — 99213 PR OFFICE/OUTPT VISIT, EST, LEVL III, 20-29 MIN: ICD-10-PCS | Mod: S$GLB,,, | Performed by: NURSE PRACTITIONER

## 2019-09-13 NOTE — PROGRESS NOTES
Subjective:       Patient ID: Jane Carrasquillo is a 44 y.o. female.    Chief Complaint: Arm Pain (LEFT )    Pt here for follow up L ARM pain that started on 09/07/2019. Pt states her injury has improved since her last visit. IJ    Arm Pain    The incident occurred 3 to 5 days ago. The incident occurred at work. There was no injury mechanism. The pain is present in the left elbow and left forearm. The pain radiates to the left hand. The pain is at a severity of 1/10. The patient is experiencing no pain. The pain has been improving since the incident. Associated symptoms include muscle weakness. Pertinent negatives include no chest pain or numbness. Nothing aggravates the symptoms. She has tried NSAIDs for the symptoms. The treatment provided mild relief.     Review of Systems   Constitution: Negative for chills, fever and malaise/fatigue.   HENT: Negative for hearing loss and nosebleeds.    Eyes: Negative for blurred vision and redness.   Cardiovascular: Negative for chest pain and syncope.   Respiratory: Negative for cough and shortness of breath.    Hematologic/Lymphatic: Negative for bleeding problem.   Skin: Negative for color change and rash.   Musculoskeletal: Positive for joint pain, muscle weakness and stiffness. Negative for back pain, joint swelling and neck pain.   Gastrointestinal: Negative for abdominal pain.   Genitourinary: Negative for hematuria.   Neurological: Positive for paresthesias. Negative for dizziness, numbness and weakness.   Psychiatric/Behavioral: The patient is not nervous/anxious.        Objective:      Physical Exam   Constitutional: She is oriented to person, place, and time. She appears well-developed and well-nourished.   HENT:   Head: Normocephalic and atraumatic.   Eyes: Pupils are equal, round, and reactive to light. Conjunctivae and EOM are normal.   Neck: Normal range of motion. Neck supple.   Pulmonary/Chest: Effort normal and breath sounds normal.   Abdominal: Soft. Bowel  sounds are normal.   Musculoskeletal: She exhibits tenderness.        Right shoulder: Normal.        Left shoulder: She exhibits tenderness (posterior ttp).        Left elbow: She exhibits decreased range of motion and swelling. Tenderness found. Lateral epicondyle tenderness noted. No radial head, no medial epicondyle and no olecranon process tenderness noted.        Left wrist: Normal.        Cervical back: Normal.        Left upper arm: She exhibits tenderness.   Patient s/p elbow joint injection   Neurological: She is alert and oriented to person, place, and time. She has normal reflexes. She displays normal reflexes. No cranial nerve deficit or sensory deficit. She exhibits normal muscle tone. Coordination and gait normal. GCS eye subscore is 4. GCS verbal subscore is 5. GCS motor subscore is 6.   Skin: Skin is warm, dry and intact. Capillary refill takes less than 2 seconds.   Psychiatric: She has a normal mood and affect. Her behavior is normal.       Assessment:       1. Biceps tendinitis of left upper extremity    2. Left elbow tendonitis    3. Left arm pain    4. Work related injury        Plan:           Jane was seen today for arm pain.    Diagnoses and all orders for this visit:    Biceps tendinitis of left upper extremity    Left elbow tendonitis    Left arm pain    Work related injury    MEDICATION AS DIRECTED AND AS NEEDED  Patient Instructions: Attention not to aggravate affected area, Apply ice 24-48 hours then apply heat/warm soaks, Daily home exercises/warm soaks, Elevated affected area   Restrictions: Limited use of left hand and arm, Avoid frequent bending/lifting/twisting, No lifting/pushing/pulling more than 10 lbs, No above the shoulder/overhead work  Follow up in about 1 week (around 9/20/2019).

## 2019-09-13 NOTE — LETTER
[facility]  [address] [place] [address]  Phone: [phone]  Fax: [phone]  [facility]: [id number]    Pt Name: Jane Carrasquillo  Injury Date: 09/07/2019   Employee ID:  Date of Treatment: 09/13/2019   Company: [employer]      Appointment Time: 08:45 AM Arrived: 1:00 PM   Provider: Meenakshi Conte NP Time Out: 3:35 PM     Office Treatment:   EXAM  LIGHT DUTY     1. Biceps tendinitis of left upper extremity    2. Left elbow tendonitis    3. Left arm pain    4. Work related injury          Patient Instructions: Attention not to aggravate affected area, Apply ice 24-48 hours then apply heat/warm soaks, Daily home exercises/warm soaks, Elevated affected area    Restrictions: Limited use of left hand and arm, Avoid frequent bending/lifting/twisting, No lifting/pushing/pulling more than 10 lbs, No above the shoulder/overhead work     Return Appointment: 09/20/2019 at 2:30 PM  JULIO

## 2019-09-13 NOTE — PATIENT INSTRUCTIONS
Elbow Flexion (Strength)    1. Stand up straight. Hold a hand weight in each hand. Your healthcare provider will tell you what size of hand weights to use. Hold your arms close to your sides, with your palms facing your body.  2. Bend your left elbow and raise the weight up to your left shoulder. As you lower that weight, bend your right elbow and raise the other weight up to your right shoulder. Continue to alternate arms. Keep your arms close to your body and keep your wrists straight.  3. Repeat 10 times.  4. Repeat 3 times a day, or as instructed.  Date Last Reviewed: 3/10/2016  © 2860-0967 [employer]. [address] [place] [address], [place] [address]. All rights reserved. This information is not intended as a substitute for professional medical care. Always follow your healthcare professional's instructions.        Understanding Biceps Tendonitis (Distal)    The biceps is the muscle on the front of the upper arm. Biceps tendons are connective tissue that attaches this muscle to the bones of the shoulder and arm. Overuse or a sudden injury to tendons can cause pain. When this problem occurs at the inner elbow, it is known as distal biceps tendonitis. Distal refers to the elbow end of the biceps, because it is farther away from center of the body.  Causes of biceps tendonitis  The biceps tendons can be damaged by:  · Lifting too heavy a weight  · Using your arms more than usual, such as training harder for a sport or doing a task that requires a lot of lifting  · Low-level stress on the tendons over time, especially repetitive motions  · Other injuries to the arm or elbow  Symptoms of biceps tendonitis  These may include:  · Pain or tenderness at the front of the elbow  · Pain that gets worse when bending the elbow or rotating the forearm  · Arm weakness  · A crackling sound or grating feeling when moving the elbow  Treatment for biceps tendonitis  This problem often gets better with rest and medicines.  Treatments aim to reduce pain and help the tendon heal. Possible treatments include:  · Avoiding actions that make the pain worse to allow the elbow to rest  · Using over-the-counter or prescription pain medicine to help relieve pain and swelling  · Applying cold packs to help relieve pain and swelling  · Trying stretches and other exercises to help with range of motion and strength  If these treatments dont do enough to relieve symptoms, physical therapy may be helpful. For severe symptoms, or if the tendon ruptures, your healthcare provider may advise surgery to repair the tendon.       When to call your healthcare provider  Call your healthcare provider right away if you have any of these:  · Fever of 100.4°F (38°C) or higher, or as directed  · Symptoms that dont get better, or get worse  · New symptoms  · Bruising or swelling of the injured arm  · Bulging appearance to the biceps muscle on the injured arm   Date Last Reviewed: 3/10/2016  © 2592-6646 [employer]. [address] [place] [address], [place] [address]. All rights reserved. This information is not intended as a substitute for professional medical care. Always follow your healthcare professional's instructions.

## 2019-09-20 ENCOUNTER — OFFICE VISIT (OUTPATIENT)
Dept: URGENT CARE | Facility: CLINIC | Age: 44
End: 2019-09-20
Payer: COMMERCIAL

## 2019-09-20 DIAGNOSIS — M77.8 LEFT ELBOW TENDONITIS: ICD-10-CM

## 2019-09-20 DIAGNOSIS — M79.602 LEFT ARM PAIN: ICD-10-CM
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
## 2019-09-20 DIAGNOSIS — M75.22 BICEPS TENDINITIS OF LEFT UPPER EXTREMITY: Primary | ICD-10-CM

## 2019-09-20 PROCEDURE — 99213 OFFICE O/P EST LOW 20 MIN: CPT | Mod: S$GLB,,, | Performed by: NURSE PRACTITIONER

## 2019-09-20 PROCEDURE — 99213 PR OFFICE/OUTPT VISIT, EST, LEVL III, 20-29 MIN: ICD-10-PCS | Mod: S$GLB,,, | Performed by: NURSE PRACTITIONER

## 2019-09-20 RX ORDER — PREDNISONE 20 MG/1
40 TABLET ORAL DAILY
Qty: 10 TABLET | Refills: 0 | Status: SHIPPED | OUTPATIENT
Start: 2019-09-20 | End: 2019-09-25

## 2019-09-20 NOTE — LETTER
[facility]  [address]  [place] [address]  Phone: [phone]  Fax: [phone]  [facility]: [id number]    Pt Name: Jane Carrasquillo  Injury Date: 09/07/2019   Employee ID: [id number] Date of Treatment: 09/20/2019   Company: [employer]      Appointment Time: 02:15 PM Arrived: 3:15 PM   Provider: EUGENIA Schultz Time Out: 3:40 PM     Office Treatment:   EXAM  RX GIVEN  LIGHT DUTY     1. Biceps tendinitis of left upper extremity    2. Left elbow tendonitis    3. Left arm pain    4. Work related injury      Medications Ordered This Encounter   Medications    predniSONE (DELTASONE) 20 MG tablet      Patient Instructions: Attention not to aggravate affected area, Elevated affected area, Apply ice 24-48 hours then apply heat/warm soaks    Restrictions: Avoid frequent bending/lifting/twisting, No lifting/pushing/pulling more than 10 lbs, No above the shoulder/overhead work, Limited use of left hand and arm     Return Appointment: 09/27/2019 at 10:30 AM  JULIO

## 2019-09-20 NOTE — PROGRESS NOTES
Subjective:       Patient ID: Jane Carrasquillo is a 44 y.o. female.    Chief Complaint: Arm Pain (LEFT )    Pt here for follow up L ARM pain that started on 09/07/2019. Pt states she is having some aching and throbbing pain. She states it improved with steriod inj. But once it wore off pain returned. Pain is worse on pulling. She states she can move lawn without difficulty.     Arm Pain    The incident occurred more than 1 week ago. The incident occurred at work. There was no injury mechanism. The pain is present in the left elbow and left forearm. The pain radiates to the left hand. The pain is at a severity of 3/10. The pain is mild. The pain has been improving since the incident. Associated symptoms include muscle weakness. Pertinent negatives include no chest pain or numbness. Nothing aggravates the symptoms. She has tried NSAIDs for the symptoms. The treatment provided mild relief.     Review of Systems   Constitution: Negative. Negative for chills, fever and malaise/fatigue.   HENT: Negative.  Negative for hearing loss and nosebleeds.    Eyes: Negative.  Negative for blurred vision and redness.   Cardiovascular: Negative.  Negative for chest pain and syncope.   Respiratory: Negative.  Negative for cough and shortness of breath.    Endocrine: Negative.    Hematologic/Lymphatic: Negative.  Negative for bleeding problem.   Skin: Negative.  Negative for color change and rash.   Musculoskeletal: Positive for joint pain, muscle weakness and stiffness. Negative for back pain, joint swelling and neck pain.   Gastrointestinal: Negative.  Negative for abdominal pain.   Genitourinary: Negative.  Negative for hematuria.   Neurological: Positive for paresthesias. Negative for dizziness, numbness and weakness.   Psychiatric/Behavioral: Negative.  The patient is not nervous/anxious.    Allergic/Immunologic: Negative.        Objective:      Physical Exam   Musculoskeletal:        Left elbow: She exhibits normal range of  motion, no swelling, no effusion and no deformity. Tenderness found. Lateral epicondyle tenderness noted. No olecranon process tenderness noted.        Arms:      Assessment:       1. Biceps tendinitis of left upper extremity    2. Left elbow tendonitis    3. Left arm pain    4. Work related injury        Plan:       Patient Instructions   Monitor for side effects from steriods-elevate your blood pressure, elevate your blood sugar, water weight gain, nervous energy, redness to the face and dimpling of the skin where the shot goes in.     Medications Ordered This Encounter   Medications    predniSONE (DELTASONE) 20 MG tablet     Sig: Take 2 tablets (40 mg total) by mouth once daily. for 5 days     Dispense:  10 tablet     Refill:  0     Patient Instructions: Attention not to aggravate affected area, Elevated affected area, Apply ice 24-48 hours then apply heat/warm soaks   Restrictions: Avoid frequent bending/lifting/twisting, No lifting/pushing/pulling more than 10 lbs, No above the shoulder/overhead work, Limited use of left hand and arm  Follow up in about 1 week (around 9/27/2019).

## 2019-09-20 NOTE — PATIENT INSTRUCTIONS
Monitor for side effects from steriods-elevate your blood pressure, elevate your blood sugar, water weight gain, nervous energy, redness to the face and dimpling of the skin where the shot goes in.

## 2019-09-27 ENCOUNTER — OFFICE VISIT (OUTPATIENT)
Dept: URGENT CARE | Facility: CLINIC | Age: 44
End: 2019-09-27
Payer: COMMERCIAL

## 2019-09-27 DIAGNOSIS — S53.402D SPRAIN OF LEFT ELBOW, SUBSEQUENT ENCOUNTER: ICD-10-CM

## 2019-09-27 DIAGNOSIS — M79.602 LEFT ARM PAIN: ICD-10-CM

## 2019-09-27 DIAGNOSIS — M77.8 LEFT ELBOW TENDONITIS: Primary | ICD-10-CM

## 2019-09-27 PROCEDURE — 73080 XR ELBOW COMPLETE 3 VIEW LEFT: ICD-10-PCS | Mod: FY,LT,S$GLB, | Performed by: RADIOLOGY

## 2019-09-27 PROCEDURE — 99213 PR OFFICE/OUTPT VISIT, EST, LEVL III, 20-29 MIN: ICD-10-PCS | Mod: S$GLB,,, | Performed by: FAMILY MEDICINE

## 2019-09-27 PROCEDURE — 73080 X-RAY EXAM OF ELBOW: CPT | Mod: FY,LT,S$GLB, | Performed by: RADIOLOGY

## 2019-09-27 PROCEDURE — 99213 OFFICE O/P EST LOW 20 MIN: CPT | Mod: S$GLB,,, | Performed by: FAMILY MEDICINE

## 2019-09-27 RX ORDER — DICLOFENAC SODIUM 75 MG/1
75 TABLET, DELAYED RELEASE ORAL 2 TIMES DAILY
Qty: 60 TABLET | Refills: 1 | Status: SHIPPED | OUTPATIENT
Start: 2019-09-27 | End: 2019-11-21 | Stop reason: SDUPTHER

## 2019-09-27 NOTE — LETTER
[facility] [address] [address]  Phone: [phone]  Fax: [phone]  [facility]: [id number]    Pt Name: Jane Carrasquillo  Injury Date: 09/07/2019   Employee ID: [id number] Date of Treatment: 09/27/2019   Company: [employer]      Appointment Time: 10:15 AM Arrived: 1032 AM   Provider: Anthony Baig MD Time Out: 1210 PM     Office Treatment:     EXAM  XRAYS TAKEN  SPLINT/PRESCRIPTION GIVEN TO PT  [id number] COMPLETED/GIVEN TO PT  FOLLOW UP APPT SCHEDULED  LIGHT DUTY    1. Left elbow tendonitis      Medications Ordered This Encounter   Medications    diclofenac (VOLTAREN) 75 MG EC tablet      Patient Instructions: Begin Physical Therapy, PT to be scheduled once authorized      Restrictions: No lifting/pushing/pulling more than 25 lbs, No above the shoulder/overhead work, Limited use of left hand and arm(Left arm restrictions until evaluated again by Occupational Health)     Return Appointment: 10/10/2019 at 0945 AM  JAYDE

## 2019-09-27 NOTE — PROGRESS NOTES
Subjective:       Patient ID: Jane Carrasquillo is a 44 y.o. female.    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  This injury is in acute exacerbation of a prior injury 1st sustained July 2018 with return to regular duty on approximately September 2018.  Patient presently feels much better after her steroid injection and dose of oral steroids. Pain score 1/10.  Patient says she is eager to return to work.  However reports periodic exacerbation of her pain with minor movements at home versus heavy lifting.     Arm Injury    The incident occurred more than 1 week ago. The incident occurred at work. The injury mechanism was repetitive motion. The pain is present in the upper left arm. The quality of the pain is described as aching. The pain does not radiate. The pain is at a severity of 1/10. The pain is mild. The pain has been constant since the incident. Associated symptoms include muscle weakness. Pertinent negatives include no chest pain or numbness. She has tried NSAIDs for the symptoms. The treatment provided moderate relief.   Arm Pain    The incident occurred more than 1 week ago. The incident occurred at work. There was no injury mechanism. The pain is present in the left elbow and left forearm. The pain radiates to the left hand. The pain is at a severity of 3/10. The pain is mild. The pain has been improving since the incident. Associated symptoms include muscle weakness. Pertinent negatives include no chest pain or numbness. Nothing aggravates the symptoms. She has tried NSAIDs for the symptoms. The treatment provided mild relief.     Review of Systems   Constitution: Negative. Negative for chills, fever and malaise/fatigue.   HENT: Negative.  Negative for hearing loss and nosebleeds.    Eyes: Negative.  Negative for blurred vision and redness.    Cardiovascular: Negative.  Negative for chest pain and syncope.   Respiratory: Negative.  Negative for cough and shortness of breath.    Endocrine: Negative.    Hematologic/Lymphatic: Negative.  Negative for bleeding problem.   Skin: Negative.  Negative for color change and rash.   Musculoskeletal: Positive for joint pain, muscle weakness and stiffness. Negative for back pain, joint swelling and neck pain.   Gastrointestinal: Negative.  Negative for abdominal pain.   Genitourinary: Negative.  Negative for hematuria.   Neurological: Positive for paresthesias. Negative for dizziness, numbness and weakness.   Psychiatric/Behavioral: Negative.  The patient is not nervous/anxious.    Allergic/Immunologic: Negative.        Objective:      Physical Exam   Constitutional: She is oriented to person, place, and time. She appears well-developed and well-nourished.   HENT:   Head: Normocephalic and atraumatic.   Eyes: Pupils are equal, round, and reactive to light. Conjunctivae and EOM are normal.   Neck: Normal range of motion. Neck supple.   Musculoskeletal:        Left elbow: She exhibits normal range of motion, no swelling, no effusion, no deformity and no laceration. Tenderness found. Radial head and lateral epicondyle tenderness noted. No medial epicondyle and no olecranon process tenderness noted.   Neurological: She is alert and oriented to person, place, and time.   Skin: Skin is warm and dry.   Psychiatric: She has a normal mood and affect. Her behavior is normal. Judgment and thought content normal.         X-ray Elbow Complete 3 View Left    Result Date: 9/27/2019  EXAMINATION: XR ELBOW COMPLETE 3 VIEW LEFT CLINICAL HISTORY: workers comp. no direct trauma. tendonitis suspected;  Other enthesopathies, not elsewhere classified FINDINGS: There is calcific medial epicondylitis.  No fracture dislocation bone destruction seen. Electronically signed by: Graham Martins MD Date:    09/27/2019 Time:    11:45    Assessment:        1. Left elbow tendonitis    2. Left arm pain    3. Sprain of left elbow, subsequent encounter        Plan:         Patient's injury is much improved with very little abnormalities detected upon physical examination.  Patient is eager to return to work.  However since this is an acute exacerbation of prior injury I recommend the patient proceeded slowly.  I am recommending imaging of her elbow, physical therapy, and increasing her workload capacity 25 lb from 10 lb.  Other restrictions are to be maintained.  I also advised the patient to take topical OTC analgesics.  Patient is to be followed up by Katherine who she has seen in the past and/or Dr. Salmon who she is familiar with from her prior injury in 2018.  Patient is to follow up in 10 days after she has had an opportunity to be seen by Physical therapy.      Medications Ordered This Encounter   Medications    diclofenac (VOLTAREN) 75 MG EC tablet     Sig: Take 1 tablet (75 mg total) by mouth 2 (two) times daily.     Dispense:  60 tablet     Refill:  1     Patient Instructions: Begin Physical Therapy, PT to be scheduled once authorized   Restrictions: No lifting/pushing/pulling more than 25 lbs, No above the shoulder/overhead work, Limited use of left hand and arm(Left arm restrictions until evaluated again by Occupational Health)  Follow up in about 13 days (around 10/10/2019).

## 2019-10-10 ENCOUNTER — OFFICE VISIT (OUTPATIENT)
Dept: URGENT CARE | Facility: CLINIC | Age: 44
End: 2019-10-10
Payer: COMMERCIAL

## 2019-10-10 DIAGNOSIS — M77.8 LEFT ELBOW TENDONITIS: ICD-10-CM
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
## 2019-10-10 PROCEDURE — 99214 OFFICE O/P EST MOD 30 MIN: CPT | Mod: S$GLB,,, | Performed by: NURSE PRACTITIONER

## 2019-10-10 PROCEDURE — 99214 PR OFFICE/OUTPT VISIT, EST, LEVL IV, 30-39 MIN: ICD-10-PCS | Mod: S$GLB,,, | Performed by: NURSE PRACTITIONER

## 2019-10-10 NOTE — LETTER
[facility] [address] [place] [address]  Phone: [phone]  Fax: [phone]  [facility]: [id number]    Pt Name: Jane Carrasquillo  Injury Date: 09/07/2019   Employee ID: [id number] Date of Treatment: 10/10/2019   Company: [employer]      Appointment Time: 09:30 AM Arrived: 0841 AM   Provider: EUGENIA Schultz Time Out: 0940 AM     Office Treatment:     EXAM  PT AUTH PENDING  FOLLOW UP APPT SCHEDULED  LIGHT DUTY    1. Work related injury    2. Left elbow tendonitis          Patient Instructions: Attention not to aggravate affected area, PT to be scheduled once authorized      Restrictions: No lifting/pushing/pulling more than 25 lbs, Limited use of left hand and arm     Return Appointment: 10/24/2019 at 0900 AM  SP

## 2019-10-10 NOTE — PROGRESS NOTES
Subjective:       Patient ID: Jane Carrasquillo is a 44 y.o. female.    Chief Complaint: Arm Injury (L ELBOW 09/07/2019)    Pt here for follow up L ARM pain that began 09/07/2019. Pt current;y has 3/10 pain after taking her pain meds and states that prior to that it was 7/10. Pt further states that she is having trouble sleeping at night. No other complaint/injury. Patient states the steriod inj and oral helped. She states she gets some relief with voltaren. She has not started pt yet.     Arm Injury    The incident occurred more than 1 week ago. The incident occurred at work. The injury mechanism was repetitive motion. The pain is present in the upper left arm. The quality of the pain is described as aching. The pain does not radiate. The pain is at a severity of 3/10. The pain is moderate. The pain has been constant since the incident. Associated symptoms include muscle weakness. Pertinent negatives include no chest pain or numbness. The symptoms are aggravated by movement and lifting. She has tried NSAIDs for the symptoms. The treatment provided mild relief.   Arm Pain    The incident occurred more than 1 week ago. The incident occurred at work. There was no injury mechanism. The pain is present in the left elbow and left forearm. The pain radiates to the left hand. The pain is at a severity of 3/10. The pain is mild. The pain has been improving since the incident. Associated symptoms include muscle weakness. Pertinent negatives include no chest pain or numbness. Nothing aggravates the symptoms. She has tried NSAIDs for the symptoms. The treatment provided mild relief.     Review of Systems   Constitution: Negative. Negative for chills, fever and malaise/fatigue.   HENT: Negative.  Negative for hearing loss and nosebleeds.    Eyes: Negative.  Negative for blurred vision and redness.   Cardiovascular: Negative.  Negative for chest pain and syncope.   Respiratory: Negative.  Negative for cough and shortness of  breath.    Endocrine: Negative.    Hematologic/Lymphatic: Negative.  Negative for bleeding problem.   Skin: Negative.  Negative for color change and rash.   Musculoskeletal: Positive for joint pain, muscle weakness and stiffness. Negative for back pain, joint swelling and neck pain.   Gastrointestinal: Negative.  Negative for abdominal pain.   Genitourinary: Negative.  Negative for hematuria.   Neurological: Positive for paresthesias. Negative for dizziness, numbness and weakness.   Psychiatric/Behavioral: Negative.  The patient is not nervous/anxious.    Allergic/Immunologic: Negative.        Objective:      Physical Exam   Constitutional: She is oriented to person, place, and time. She appears well-developed and well-nourished.   HENT:   Head: Normocephalic and atraumatic.   Eyes: Pupils are equal, round, and reactive to light. Conjunctivae and EOM are normal.   Neck: Normal range of motion. Neck supple.   Musculoskeletal:        Left elbow: She exhibits normal range of motion, no swelling, no effusion, no deformity and no laceration. Tenderness found. Radial head and lateral epicondyle tenderness noted. No medial epicondyle and no olecranon process tenderness noted.   Neurological: She is alert and oriented to person, place, and time.   Skin: Skin is warm and dry.   Psychiatric: She has a normal mood and affect. Her behavior is normal. Judgment and thought content normal.       Assessment:       1. Work related injury    2. Left elbow tendonitis        Plan:    pending referral to pt- will see back in two weeks - continue voltaren - consider referral to dr duarte      Patient Instructions: Attention not to aggravate affected area, PT to be scheduled once authorized   Restrictions: No lifting/pushing/pulling more than 25 lbs, Limited use of left hand and arm  Follow up in about 2 weeks (around 10/24/2019).

## 2019-10-24 ENCOUNTER — OFFICE VISIT (OUTPATIENT)
Dept: URGENT CARE | Facility: CLINIC | Age: 44
End: 2019-10-24
Payer: COMMERCIAL

## 2019-10-24 DIAGNOSIS — S53.402D SPRAIN OF LEFT ELBOW, SUBSEQUENT ENCOUNTER: ICD-10-CM

## 2019-10-24 DIAGNOSIS — M25.422 ELBOW SWELLING, LEFT: ICD-10-CM

## 2019-10-24 DIAGNOSIS — M77.8 LEFT ELBOW TENDONITIS: Primary | ICD-10-CM

## 2019-10-24 PROCEDURE — 99214 OFFICE O/P EST MOD 30 MIN: CPT | Mod: S$GLB,,, | Performed by: PREVENTIVE MEDICINE

## 2019-10-24 PROCEDURE — 99214 PR OFFICE/OUTPT VISIT, EST, LEVL IV, 30-39 MIN: ICD-10-PCS | Mod: S$GLB,,, | Performed by: PREVENTIVE MEDICINE

## 2019-10-24 NOTE — LETTER
[facility]  [address]  [place] [address]  Phone: [phone]  Fax: [phone]  [facility]: [id number]    Pt Name: Jane Carrasquillo  Injury Date: 09/07/2019   Employee ID: [id number] Date of Treatment: 10/24/2019   Company: [employer]      Appointment Time: 08:45 AM Arrived: 8:45 AM   Provider: Spike Salomn MD Time Out: 9:55 AM     Office Treatment:   EXAM  LIGHT DUTY     1. Left elbow tendonitis    2. Sprain of left elbow, subsequent encounter    3. Elbow swelling, left          Patient Instructions: Daily home exercises/warm soaks, Use splint as directed    Restrictions: No lifting/pushing/pulling more than 25 lbs, Limited use of left hand and arm     Return Appointment: 11/07/2019 at 9:00 AM  IJ

## 2019-10-24 NOTE — PROGRESS NOTES
Subjective:       Patient ID: Jane Carrasquillo is a 44 y.o. female.    Chief Complaint: Arm Injury (Left)    W/C f/u- Left arm ( DOI 9/7/2019). Patient states she is still in pain does not feel her arm is getting better. She continues to take the medication Diclofenac which seems to be helping somewhat. The patient states she has not heard anything back about physical therapy. She describes her pain as aching, throbbing and shooting pain, pain level 2/10 on today    Arm Injury    The incident occurred more than 1 week ago. The incident occurred at work. The injury mechanism was repetitive motion. The pain is present in the left forearm and left elbow. The quality of the pain is described as aching, burning and shooting. The pain radiates to the left arm. The pain is at a severity of 2/10. The pain is mild. The pain has been constant since the incident. Associated symptoms include muscle weakness, numbness and tingling. The symptoms are aggravated by lifting and movement. Treatments tried: Diclofenac. The treatment provided moderate relief.     Review of Systems   Constitution: Negative.   HENT: Negative.    Eyes: Negative.    Cardiovascular: Negative.    Respiratory: Negative.    Skin: Negative.    Musculoskeletal: Positive for joint swelling and muscle weakness.   Gastrointestinal: Negative.    Genitourinary: Negative.    Neurological: Positive for numbness and tingling.   Psychiatric/Behavioral: Negative.        Objective:      Physical Exam   Constitutional: She appears well-developed and well-nourished.   HENT:   Head: Normocephalic and atraumatic.   Eyes: Pupils are equal, round, and reactive to light. EOM are normal.   Neck: Normal range of motion. Neck supple.   Cardiovascular: Normal rate, regular rhythm and normal heart sounds.   Pulmonary/Chest: Effort normal and breath sounds normal.   Musculoskeletal:        Left elbow: She exhibits decreased range of motion, swelling and effusion. She exhibits no  deformity and no laceration. Tenderness found. Lateral epicondyle tenderness noted. No radial head, no medial epicondyle and no olecranon process tenderness noted.        Lumbar back: She exhibits no bony tenderness, no swelling, no edema, no deformity, no laceration, no spasm and normal pulse.        Arms:  Pain persists about left elbow lateral aspect. Mild swelling with inspection and palpation of left elbow. Pulses good.   Neurological: She is alert.   No focal neurologic deficits   Skin: Skin is warm.   Psychiatric: She has a normal mood and affect.   Nursing note and vitals reviewed.      Assessment:       1. Left elbow tendonitis    2. Sprain of left elbow, subsequent encounter    3. Elbow swelling, left        Plan:     patient has not had physical therapy for her left elbow. She has had one cortisone injection into her left elbow and one medrol dose pack previously.        Patient Instructions: Daily home exercises/warm soaks, Use splint as directed   Restrictions: No lifting/pushing/pulling more than 25 lbs, Limited use of left hand and arm  Follow up in about 2 weeks (around 11/7/2019).

## 2019-11-07 ENCOUNTER — OFFICE VISIT (OUTPATIENT)
Dept: URGENT CARE | Facility: CLINIC | Age: 44
End: 2019-11-07
Payer: COMMERCIAL

## 2019-11-07 DIAGNOSIS — M77.8 LEFT ELBOW TENDONITIS: Primary | ICD-10-CM

## 2019-11-07 DIAGNOSIS — S53.402D SPRAIN OF LEFT ELBOW, SUBSEQUENT ENCOUNTER: ICD-10-CM

## 2019-11-07 DIAGNOSIS — M77.12 LATERAL EPICONDYLITIS OF LEFT ELBOW: ICD-10-CM

## 2019-11-07 DIAGNOSIS — M25.422 ELBOW SWELLING, LEFT: ICD-10-CM

## 2019-11-07 PROCEDURE — 99214 OFFICE O/P EST MOD 30 MIN: CPT | Mod: S$GLB,,, | Performed by: PREVENTIVE MEDICINE

## 2019-11-07 PROCEDURE — 99214 PR OFFICE/OUTPT VISIT, EST, LEVL IV, 30-39 MIN: ICD-10-PCS | Mod: S$GLB,,, | Performed by: PREVENTIVE MEDICINE

## 2019-11-07 NOTE — LETTER
[facility] [address]  [place] [address]  Phone: [phone]  Fax: [phone]  [facility]: [id number]    Pt Name: Jane Carrasquillo  Injury Date: 09/07/2019   Employee ID: [id number] Date of Treatment: 11/07/2019   Company: [id number]      Appointment Time: 08:45 AM Arrived: 0830 AM   Provider: [facility] Time Out: 0950 AM     Office Treatment:     EXAM  PT EXTENDED  FOLLOW UP APPT SCHEDULED  LIGHT DUTY    1. Left elbow tendonitis    2. Lateral epicondylitis of left elbow    3. Sprain of left elbow, subsequent encounter    4. Elbow swelling, left          Patient Instructions: Daily home exercises/warm soaks      Restrictions: Limited use of left hand and arm, No lifting/pushing/pulling more than 25 lbs     Return Appointment: 11/21/2019 at 0900 AM  JAYDE

## 2019-11-07 NOTE — PROGRESS NOTES
Subjective:       Patient ID: Jane Carrasquillo is a 44 y.o. female.    Chief Complaint: Arm Injury (L ELBOW 09/07/2019)    Pt here for follow up L ELBOW injury on 09/07/2019. Pt states her pain is 2/10, meds and PT are helping. No new complaint/injury. SP    Arm Injury    The incident occurred more than 1 week ago. The incident occurred at work. The injury mechanism was repetitive motion. The pain is present in the left forearm and left elbow. The quality of the pain is described as aching, burning and shooting. The pain radiates to the left arm. The pain is at a severity of 2/10. The pain is mild. The pain has been constant since the incident. Associated symptoms include muscle weakness, numbness and tingling. The symptoms are aggravated by lifting and movement. Treatments tried: Diclofenac. The treatment provided moderate relief.     Review of Systems   Constitution: Negative.   HENT: Negative.    Eyes: Negative.    Cardiovascular: Negative.    Respiratory: Negative.    Skin: Negative.    Musculoskeletal: Positive for joint swelling and muscle weakness.   Gastrointestinal: Negative.    Genitourinary: Negative.    Neurological: Positive for numbness and tingling.   Psychiatric/Behavioral: Negative.        Objective:      Physical Exam   Constitutional: She appears well-developed and well-nourished.   HENT:   Head: Normocephalic and atraumatic.   Eyes: Pupils are equal, round, and reactive to light. EOM are normal.   Neck: Normal range of motion. Neck supple.   Cardiovascular: Normal rate, regular rhythm and normal heart sounds.   Pulmonary/Chest: Effort normal and breath sounds normal.   Musculoskeletal:        Left elbow: She exhibits decreased range of motion, swelling and effusion. She exhibits no deformity and no laceration. Tenderness found. Lateral epicondyle tenderness noted. No radial head, no medial epicondyle and no olecranon process tenderness noted.        Lumbar back: She exhibits no bony tenderness,  no swelling, no edema, no deformity, no laceration, no spasm and normal pulse.        Arms:  Pain persists about left elbow lateral aspect. Mild swelling with inspection and palpation of left elbow. Pulses good.   Neurological: She is alert.   No focal neurologic deficits   Skin: Skin is warm.   Psychiatric: She has a normal mood and affect.   Nursing note and vitals reviewed.      Assessment:       1. Left elbow tendonitis    2. Lateral epicondylitis of left elbow    3. Sprain of left elbow, subsequent encounter    4. Elbow swelling, left        Plan:     Patient will continue therapy and have repeat MRI of left elbow. May need referral to hand specialist if not improved with physical therapy. Continue taking Diclofenac as she has already had medrol dose pack and one previous cortisone injection with her last left elbow injury earlier this year.        Patient Instructions: Daily home exercises/warm soaks   Restrictions: Limited use of left hand and arm, No lifting/pushing/pulling more than 25 lbs  Follow up in about 2 weeks (around 11/21/2019).

## 2019-11-15 ENCOUNTER — HOSPITAL ENCOUNTER (OUTPATIENT)
Dept: RADIOLOGY | Facility: HOSPITAL | Age: 44
Discharge: HOME | End: 2019-11-15
Attending: PREVENTIVE MEDICINE
Payer: COMMERCIAL

## 2019-11-15 DIAGNOSIS — M25.422 ELBOW SWELLING, LEFT: ICD-10-CM

## 2019-11-15 DIAGNOSIS — S53.402D SPRAIN OF LEFT ELBOW, SUBSEQUENT ENCOUNTER: ICD-10-CM

## 2019-11-15 DIAGNOSIS — M77.8 LEFT ELBOW TENDONITIS: ICD-10-CM

## 2019-11-15 DIAGNOSIS — M77.12 LATERAL EPICONDYLITIS OF LEFT ELBOW: ICD-10-CM

## 2019-11-15 PROCEDURE — 73221 MRI JOINT UPR EXTREM W/O DYE: CPT | Mod: TC,PO,LT

## 2019-11-21 ENCOUNTER — OFFICE VISIT (OUTPATIENT)
Dept: URGENT CARE | Facility: CLINIC | Age: 44
End: 2019-11-21
Payer: COMMERCIAL

## 2019-11-21 DIAGNOSIS — M77.8 LEFT ELBOW TENDONITIS: ICD-10-CM

## 2019-11-21 DIAGNOSIS — M77.12 LATERAL EPICONDYLITIS OF LEFT ELBOW: Primary | ICD-10-CM

## 2019-11-21 DIAGNOSIS — M25.422 ELBOW SWELLING, LEFT: ICD-10-CM

## 2019-11-21 DIAGNOSIS — J30.2 SEASONAL ALLERGIC RHINITIS, UNSPECIFIED TRIGGER: ICD-10-CM

## 2019-11-21 DIAGNOSIS — S53.402D SPRAIN OF LEFT ELBOW, SUBSEQUENT ENCOUNTER: ICD-10-CM

## 2019-11-21 PROCEDURE — 99214 OFFICE O/P EST MOD 30 MIN: CPT | Mod: S$GLB,,, | Performed by: PREVENTIVE MEDICINE

## 2019-11-21 PROCEDURE — 99214 PR OFFICE/OUTPT VISIT, EST, LEVL IV, 30-39 MIN: ICD-10-PCS | Mod: S$GLB,,, | Performed by: PREVENTIVE MEDICINE

## 2019-11-21 RX ORDER — DICLOFENAC SODIUM 75 MG/1
75 TABLET, DELAYED RELEASE ORAL 2 TIMES DAILY
Qty: 60 TABLET | Refills: 1 | Status: SHIPPED | OUTPATIENT
Start: 2019-11-21 | End: 2020-01-20

## 2019-11-21 RX ORDER — LEVOCETIRIZINE DIHYDROCHLORIDE 5 MG/1
TABLET, FILM COATED ORAL
Qty: 30 TABLET | Refills: 11 | OUTPATIENT
Start: 2019-11-21

## 2019-11-21 NOTE — PROGRESS NOTES
Subjective:       Patient ID: Jane Carrasquillo is a 44 y.o. female.    Chief Complaint: Arm Injury (LT ELBOW)    Pt here for follow up L ELBOW injury on 09/07/2019. Pt states her pain is 4/10. Pt states she is currently out of medication but PT is helping. No new complaint/injury. IJ    Arm Injury    The incident occurred more than 1 week ago. The incident occurred at work. The injury mechanism was repetitive motion. The pain is present in the left forearm and left elbow. The quality of the pain is described as aching, burning and shooting. The pain radiates to the left arm. The pain is at a severity of 4/10. The pain is mild. The pain has been constant since the incident. Associated symptoms include muscle weakness, numbness and tingling. Pertinent negatives include no chest pain. The symptoms are aggravated by lifting and movement. Treatments tried: Diclofenac. The treatment provided moderate relief.     Review of Systems   Constitution: Negative for chills and fever.   HENT: Negative for sore throat.    Eyes: Negative for blurred vision.   Cardiovascular: Negative for chest pain.   Respiratory: Negative for shortness of breath.    Skin: Negative.  Negative for rash.   Musculoskeletal: Positive for joint swelling and muscle weakness. Negative for back pain and joint pain.   Gastrointestinal: Negative for abdominal pain, diarrhea, nausea and vomiting.   Neurological: Positive for numbness and tingling. Negative for headaches.   Psychiatric/Behavioral: The patient is not nervous/anxious.        Objective:      Physical Exam   Constitutional: She appears well-developed and well-nourished.   HENT:   Head: Normocephalic and atraumatic.   Eyes: Pupils are equal, round, and reactive to light. EOM are normal.   Neck: Normal range of motion. Neck supple.   Cardiovascular: Normal rate, regular rhythm and normal heart sounds.   Pulmonary/Chest: Effort normal and breath sounds normal.   Musculoskeletal:        Left elbow: She  exhibits decreased range of motion, swelling and effusion. She exhibits no deformity and no laceration. Tenderness found. Lateral epicondyle tenderness noted. No radial head, no medial epicondyle and no olecranon process tenderness noted.        Lumbar back: She exhibits no bony tenderness, no swelling, no edema, no deformity, no laceration, no spasm and normal pulse.        Arms:  Pain persists about left elbow lateral aspect. Mild swelling with inspection and palpation of left elbow. Pulses good.   Neurological: She is alert.   No focal neurologic deficits   Skin: Skin is warm.   Psychiatric: She has a normal mood and affect.   Nursing note and vitals reviewed.      Assessment:       1. Lateral epicondylitis of left elbow    2. Left elbow tendonitis    3. Sprain of left elbow, subsequent encounter    4. Elbow swelling, left        Plan:     Discussed results of MRI of left elbow which revealed no full tears or fractures about left elbow. She will be scheduled to be evaluated by Dr Herman for possible cortisone injection for left elbow.     Medications Ordered This Encounter   Medications    diclofenac (VOLTAREN) 75 MG EC tablet     Sig: Take 1 tablet (75 mg total) by mouth 2 (two) times daily.     Dispense:  60 tablet     Refill:  1     Patient Instructions: Daily home exercises/warm soaks, Referral to specialist to be scheduled, once authorized   Restrictions: Limited use of left hand and arm, No lifting/pushing/pulling more than 25 lbs  Follow up in about 3 weeks (around 12/12/2019).

## 2019-11-21 NOTE — LETTER
[facility]  [address] [place] [address]  Phone: [phone]  Fax: [phone]  [facility]: [id number]    Pt Name: Jane Carrasquillo  Injury Date: 09/07/2019   Employee ID: [id number] Date of Treatment: 11/21/2019   Company: [employer]      Appointment Time: 08:45 AM Arrived: 8:30 AM   Provider: Spike Salmon MD Time Out: 9:50 AM     Office Treatment:   EXAM  RX GIVEN  REFERRAL TO SPECIALIST TO BE SCHEDULED ONCE AUTHORIZED  LIGHT DUTY     1. Lateral epicondylitis of left elbow    2. Left elbow tendonitis    3. Sprain of left elbow, subsequent encounter    4. Elbow swelling, left      Medications Ordered This Encounter   Medications    diclofenac (VOLTAREN) 75 MG EC tablet      Patient Instructions: Daily home exercises/warm soaks, Referral to specialist to be scheduled, once authorized    Restrictions: Limited use of left hand and arm, No lifting/pushing/pulling more than 25 lbs     Return Appointment: 12/12/2019 at 9:30 AM  JULIO

## 2019-12-02 ENCOUNTER — OFFICE VISIT (OUTPATIENT)
Dept: URGENT CARE | Facility: CLINIC | Age: 44
End: 2019-12-02
Payer: COMMERCIAL

## 2019-12-02 VITALS
HEIGHT: 66 IN | HEART RATE: 62 BPM | RESPIRATION RATE: 18 BRPM | BODY MASS INDEX: 32.14 KG/M2 | WEIGHT: 200 LBS | SYSTOLIC BLOOD PRESSURE: 138 MMHG | DIASTOLIC BLOOD PRESSURE: 70 MMHG | OXYGEN SATURATION: 100 % | TEMPERATURE: 99 F

## 2019-12-02 DIAGNOSIS — M62.838 MUSCLE SPASM: ICD-10-CM

## 2019-12-02 DIAGNOSIS — M79.642 HAND PAIN, LEFT: Primary | ICD-10-CM

## 2019-12-02 DIAGNOSIS — T14.8XXA MUSCLE STRAIN: ICD-10-CM

## 2019-12-02 PROCEDURE — 73130 XR HAND COMPLETE 3 VIEW LEFT: ICD-10-PCS | Mod: FY,LT,S$GLB, | Performed by: RADIOLOGY

## 2019-12-02 PROCEDURE — 96372 PR INJECTION,THERAP/PROPH/DIAG2ST, IM OR SUBCUT: ICD-10-PCS | Mod: S$GLB,,, | Performed by: PHYSICIAN ASSISTANT

## 2019-12-02 PROCEDURE — 73130 X-RAY EXAM OF HAND: CPT | Mod: FY,LT,S$GLB, | Performed by: RADIOLOGY

## 2019-12-02 PROCEDURE — 99214 PR OFFICE/OUTPT VISIT, EST, LEVL IV, 30-39 MIN: ICD-10-PCS | Mod: 25,S$GLB,, | Performed by: PHYSICIAN ASSISTANT

## 2019-12-02 PROCEDURE — 99214 OFFICE O/P EST MOD 30 MIN: CPT | Mod: 25,S$GLB,, | Performed by: PHYSICIAN ASSISTANT

## 2019-12-02 PROCEDURE — 96372 THER/PROPH/DIAG INJ SC/IM: CPT | Mod: S$GLB,,, | Performed by: PHYSICIAN ASSISTANT

## 2019-12-02 RX ORDER — METHOCARBAMOL 500 MG/1
500 TABLET, FILM COATED ORAL 3 TIMES DAILY
Qty: 21 TABLET | Refills: 0 | Status: SHIPPED | OUTPATIENT
Start: 2019-12-02 | End: 2019-12-09

## 2019-12-02 RX ORDER — KETOROLAC TROMETHAMINE 30 MG/ML
60 INJECTION, SOLUTION INTRAMUSCULAR; INTRAVENOUS
Status: COMPLETED | OUTPATIENT
Start: 2019-12-02 | End: 2019-12-02

## 2019-12-02 RX ADMIN — KETOROLAC TROMETHAMINE 60 MG: 30 INJECTION, SOLUTION INTRAMUSCULAR; INTRAVENOUS at 02:12

## 2019-12-02 NOTE — PATIENT INSTRUCTIONS
Muscle Spasm  A muscle spasm (also called a cramp) is an involuntary muscle contraction. The muscle tightens quickly and strongly. A hard lump may form in the muscle. Muscle spasms are very painful. Read on to learn more about muscle spasms and how to treat and prevent them.    What causes muscles to spasm?  Often, the cause of a muscle spasm is not known. Muscle spasm is due to irritation of muscle fibers. Some things can make a muscle spasm more likely. These include:  · Injury  · Heavy exercise  · Overtired muscles  · A muscle held in one position for a long time  · Dehydration  · Low levels of certain minerals in the body  · Taking certain medications, such as diuretics or water pills  · Certain medical conditions, such as kidney failure or diabetes  · Being pregnant  Stopping a muscle spasm  Muscle spasms often come and go quickly. When a muscle goes into spasm, very gently stretch and massage the muscle. This may help calm the muscle fibers. Then rest the muscle.  Preventing muscle spasms  Although there is little or no evidence that staying hydrated, taking certain vitamins or minerals or stretching works to prevent cramps, these measures may help and have other benefits. Talk to your health care provider about steps to take to avoid muscle spasms. These may include:  · Drinking enough fluids to avoid dehydration, especially when you exercise.  · Taking vitamin or mineral supplements.  · Getting regular exercise.  · Stretching regularly, especially before exercise.  · Limit caffeine and smoking.  · Taking a prescription muscle relaxant.  When to call your doctor  Call your doctor if you have any of the following:  · Severe cramping  · Cramping that lasts a long time, does not go away with stretching, or keeps coming back  · Pain, tingling, or weakness in the arms or legs  · Pain that wakes you up at night   Date Last Reviewed: 9/1/2015  © 9757-4155 [employer]. [address] [place] [address], [facility]  [place] [address]. All rights reserved. This information is not intended as a substitute for professional medical care. Always follow your healthcare professional's instructions.        R.I.C.E.    R.I.C.E. stands for Rest, Ice, Compression, and Elevation. Doing these things helps limit pain and swelling after an injury. R.I.C.E. also helps injuries heal faster. Use R.I.C.E. for sprains, strains, and severe bruises or bumps. Follow the tips on this handout and begin R.I.C.E. as soon as possible after an injury.  ? Rest  Pain is your bodys way of telling you to rest an injured area. Whether you have hurt an elbow, hand, foot, or knee, limiting its use will prevent further injury and help you heal.  ? Ice  Applying ice right after an injury helps prevent swelling and reduce pain. Dont place ice directly on your skin.  · Wrap a cold pack or bag of ice in a thin cloth. Place it over the injured area.  · Ice for 10 minutes every 3 hours. Dont ice for more than 20 minutes at a time.  ? Compression  Putting pressure (compression) on an injury helps prevent swelling and provides support.  · Wrap the injured area firmly with an elastic bandage. If your hand or foot tingles, becomes discolored, or feels cold to the touch, the bandage may be too tight. Rewrap it more loosely.  · If your bandage becomes too loose, rewrap it.  · Do not wear an elastic bandage overnight.  ? Elevation  Keeping an injury elevated helps reduce swelling, pain, and throbbing. Elevation is most effective when the injury is kept elevated higher than the heart.     Call your healthcare provider if you notice any of the following:  · Fingers or toes feel numb, are cold to the touch, or change color  · Skin looks shiny or tight  · Pain, swelling, or bruising worsens and is not improved with elevation   Date Last Reviewed: 9/3/2015  © 4547-7412 [employer]. [address] [place] [address], [place] [address]. All rights reserved. This information is not  intended as a substitute for professional medical care. Always follow your healthcare professional's instructions.      Please follow up with your Primary care provider within 2-5 days if your signs and symptoms have not resolved or worsen.     If your condition worsens or fails to improve we recommend that you receive another evaluation at the emergency room immediately or contact your primary medical clinic to discuss your concerns.   You must understand that you have received an Urgent Care treatment only and that you may be released before all of your medical problems are known or treated. You, the patient, will arrange for follow up care as instructed.     RED FLAGS/WARNING SYMPTOMS DISCUSSED WITH PATIENT THAT WOULD WARRANT EMERGENT MEDICAL ATTENTION. PATIENT VERBALIZED UNDERSTANDING.
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
## 2020-07-08 ENCOUNTER — OFFICE VISIT (OUTPATIENT)
Dept: URGENT CARE | Facility: CLINIC | Age: 45
End: 2020-07-08
Payer: COMMERCIAL

## 2020-07-08 VITALS
SYSTOLIC BLOOD PRESSURE: 90 MMHG | DIASTOLIC BLOOD PRESSURE: 60 MMHG | HEART RATE: 74 BPM | TEMPERATURE: 98 F | RESPIRATION RATE: 18 BRPM | OXYGEN SATURATION: 97 % | BODY MASS INDEX: 33.27 KG/M2 | WEIGHT: 207 LBS | HEIGHT: 66 IN

## 2020-07-08 DIAGNOSIS — S46.911A STRAIN OF RIGHT SHOULDER, INITIAL ENCOUNTER: Primary | ICD-10-CM

## 2020-07-08 DIAGNOSIS — M25.511 ACUTE PAIN OF RIGHT SHOULDER: ICD-10-CM
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
## 2020-07-08 PROCEDURE — 99214 PR OFFICE/OUTPT VISIT, EST, LEVL IV, 30-39 MIN: ICD-10-PCS | Mod: S$GLB,,, | Performed by: PHYSICIAN ASSISTANT

## 2020-07-08 PROCEDURE — 99214 OFFICE O/P EST MOD 30 MIN: CPT | Mod: S$GLB,,, | Performed by: PHYSICIAN ASSISTANT

## 2020-07-08 PROCEDURE — 73030 X-RAY EXAM OF SHOULDER: CPT | Mod: FY,RT,S$GLB, | Performed by: RADIOLOGY

## 2020-07-08 PROCEDURE — 73030 XR SHOULDER TRAUMA 3 VIEW RIGHT: ICD-10-PCS | Mod: FY,RT,S$GLB, | Performed by: RADIOLOGY

## 2020-07-08 RX ORDER — IBUPROFEN 600 MG/1
600 TABLET ORAL EVERY 6 HOURS PRN
Qty: 30 TABLET | Refills: 0 | Status: SHIPPED | OUTPATIENT
Start: 2020-07-08

## 2020-07-08 NOTE — LETTER
[facility]  [address]  [place] [address]  Phone: [phone]  Fax: [phone]  [facility]: [email or web address]    Pt Name: Jane Carrasquillo  Injury Date: 07/06/2020   Employee ID: [id number] Date of First Treatment: 07/08/2020   Company: [employer]      Appointment Time: 09:15 AM Arrived: 8:55 a.m.   Provider: Óscar Guzmán PA-C Time Out: 11:06 a.m.     Office Treatment:   1. Strain of right shoulder, initial encounter    2. Acute pain of right shoulder    3. Work related injury      Medications Ordered This Encounter   Medications    ibuprofen (ADVIL,MOTRIN) 600 MG tablet      Patient Instructions: Daily home exercises/warm soaks, Attention not to aggravate affected area    Restrictions: Regular Duty     Return Appointment: 7/15/2020 at 10:30 a.m.  [place]

## 2020-07-08 NOTE — PROGRESS NOTES
Subjective:       Patient ID: Jane Carrasquillo is a 45 y.o. female.    Chief Complaint: Shoulder Injury (Right)    New injury- Right shoulder (DOI 7/6/2020) [facility]- Patient states she was loading bags of luggage onto carts and felt a pull and spasms in her right shoulder. She then had to take the bags of the cart and dump the bags and her shoulder felt worse. She says she felt hotness, swelling and could not move her arm. As of today, she feels burning, itching, hotness and pain if she moves her arm a certain way or tries to lift anything. Pain level 5/10 on today with movement. She states she already has an injury for her left elbow and has been referred to Dr. Herman for that injury. She is using the same medications given to her for the left elbow (Lidocaine, Voltaren gel and Ibuprofen). [place]    Shoulder Injury   The incident occurred at work. The right shoulder is affected. The incident occurred 2 days ago. The injury mechanism was repetitive motion and overhead work. The quality of the pain is described as burning and aching. The pain does not radiate. The pain is at a severity of 5/10. The pain is moderate. Pertinent negatives include no chest pain, muscle weakness, numbness or tingling. The symptoms are aggravated by movement and overhead lifting. She has tried NSAIDs (Voltaren gel and Lidocaine) for the symptoms. The treatment provided mild relief.       Constitution: Negative for fatigue.   HENT: Negative for facial swelling and facial trauma.    Neck: Negative for neck stiffness.   Cardiovascular: Negative for chest trauma and chest pain.   Eyes: Negative for eye trauma, double vision and blurred vision.   Respiratory: Negative.    Gastrointestinal: Negative for abdominal trauma, abdominal pain and rectal bleeding.   Endocrine: negative.   Genitourinary: Negative for hematuria, missed menses, genital trauma and pelvic pain.   Musculoskeletal: Positive for pain, joint pain, abnormal ROM of joint and muscle  ache. Negative for trauma and joint swelling.   Skin: Negative for color change, wound, abrasion, laceration and bruising.   Allergic/Immunologic: Negative.    Neurological: Negative for dizziness, history of vertigo, light-headedness, coordination disturbances, altered mental status, loss of consciousness and numbness.   Hematologic/Lymphatic: Negative for history of bleeding disorder.   Psychiatric/Behavioral: Negative for altered mental status.        Objective:      Physical Exam  Vitals signs and nursing note reviewed.   Constitutional:       General: She is not in acute distress.     Appearance: She is well-developed. She is not diaphoretic.   HENT:      Head: Normocephalic and atraumatic.      Right Ear: Hearing and external ear normal.      Left Ear: Hearing and external ear normal.      Nose: Nose normal. No nasal deformity.   Eyes:      General: Lids are normal. No scleral icterus.     Conjunctiva/sclera: Conjunctivae normal.   Neck:      Musculoskeletal: Normal range of motion.      Trachea: Trachea normal.   Cardiovascular:      Pulses: Normal pulses.           Radial pulses are 2+ on the right side and 2+ on the left side.   Pulmonary:      Effort: Pulmonary effort is normal. No respiratory distress.   Musculoskeletal:      Right shoulder: She exhibits tenderness (anterior aspect) and pain (O'Thomas positive, Amber, Neff, Neer negative). She exhibits normal range of motion, no deformity, normal pulse and normal strength.   Skin:     General: Skin is warm and dry.      Capillary Refill: Capillary refill takes less than 2 seconds.   Neurological:      Mental Status: She is alert. She is not disoriented.      GCS: GCS eye subscore is 4. GCS verbal subscore is 5. GCS motor subscore is 6.      Sensory: No sensory deficit.   Psychiatric:         Attention and Perception: She is attentive.         Speech: Speech normal.         Behavior: Behavior normal.           X-ray Shoulder Trauma 3 View Right    Result  Date: 7/8/2020  EXAMINATION: XR SHOULDER TRAUMA 3 VIEW RIGHT CLINICAL HISTORY: right shoulder pain x 1 week. Pt is a  for [employer]. No direct trauma.;Pain in right shoulder TECHNIQUE: Three or four views of the right shoulder were performed. COMPARISON: None FINDINGS: There is no acute fractures or dislocations.  There is osteoarthritic changes of the acromioclavicular joint.  No osteoblastic or lytic lesions.  Soft tissues are unremarkable.  The visualized ribs demonstrate no significant abnormalities.     1. DJD of the acromioclavicular joint. 2. No acute fractures or dislocations. Electronically signed by: Donte Blank MD Date:    07/08/2020 Time:    10:44    Assessment:       1. Strain of right shoulder, initial encounter    2. Acute pain of right shoulder    3. Work related injury        Plan:       Order physical therapy if no significant improvement at next office visit.  I Offered light duty, however patient stated she could work her regular duties.      Medications Ordered This Encounter   Medications    ibuprofen (ADVIL,MOTRIN) 600 MG tablet     Sig: Take 1 tablet (600 mg total) by mouth every 6 (six) hours as needed for Pain. Take with meals.     Dispense:  30 tablet     Refill:  0     Patient Instructions: Daily home exercises/warm soaks, Attention not to aggravate affected area   Restrictions: Regular Duty  Follow up in about 1 week (around 7/15/2020).        Patient Instructions       Exercises for Shoulder Flexibility: External Rotation    This stretch can help restore shoulder flexibility and relieve pain over time. When stretching, be sure to breathe deeply. Follow any special instructions from your doctor or physical therapist:  1.  a doorway. Grasp the doorjamb with the hand on the frozen side. Your arm should be bent.  2. With the other hand, hold the elbow on the frozen side firmly against your body.  3. Standing in the same spot, rotate your body away from the doorjamb.  Stop when you feel the stretch in the shoulder. At first, try to hold the stretch for 5 seconds.  4. Work up to doing 3 sets of this stretch, 3 times a day. Work up to holding the stretch for 30 to 60 seconds.  Note: Keep your arms as still as you can. Over time, rotate your body a little more to enhance the stretch. But be careful not to twist your back.  Frozen shoulder  Frozen shoulder is another name for adhesive capsulitis, which causes restricted movement in the shoulder. If you have frozen shoulder, this stretch may cause discomfort, especially when you first get started. A few months may pass before you achieve the results you want. But once your shoulder heals, it rarely becomes frozen again. So stick to your stretching program. If you have any questions, be sure to ask your doctor.   Date Last Reviewed: 8/16/2015  © 3539-5770 [employer]. [address] [place] [address]. All rights reserved. This information is not intended as a substitute for professional medical care. Always follow your healthcare professional's instructions.        Exercises for Shoulder Flexibility: Internal Rotation    This stretch can help restore shoulder flexibility and relieve pain over time. When stretching, be sure to breathe deeply. Follow any special instructions from your healthcare provider or physical therapist.  5. While seated, move the arm on the side you want to stretch toward the middle of your back. The palm of your hand should face out.  6. Cup your other hand under the hand thats behind your back. Gently push your cupped hand upward until you feel the stretch in the shoulder. Try to hold the stretch for 5 seconds.  7. Work up to doing 3 sets of this stretch, 3 times a day. Work up to holding the stretch for 30 to 60 seconds.  Note: Keep your back straight. Its OK if your hand cant reach the middle of your back. Instead, start the stretch with your hand as close as you can get it to the  middle of your back.     Frozen shoulder  Frozen shoulder is another name for adhesive capsulitis. This causes restricted movement in the shoulder. If you have frozen shoulder, this stretch may cause discomfort, especially when you first get started. A few months may pass before you achieve the results you want. But once your shoulder heals, it rarely becomes frozen again. So stick to your stretching program. If you have any questions, be sure to ask your healthcare provider.   Date Last Reviewed: 10/14/2015  © 8905-2579 [employer]. [address] [place] [address]. All rights reserved. This information is not intended as a substitute for professional medical care. Always follow your healthcare professional's instructions.        Exercises for Shoulder Flexibility: Adduction (Reaching Across)    This stretch can help restore shoulder flexibility and relieve pain over time. When stretching, be sure to breathe deeply. And follow any special instructions from your doctor or physical therapist:  8. Put the hand from the side you want to stretch on your opposite shoulder. Your elbow should point away from your body. Try to raise your elbow as close to shoulder height as you can.  9. With your other hand, push the raised elbow toward the opposite shoulder. Avoid turning your head. Stop when you feel the stretch. Try to hold the stretch for 5 seconds.  10. Work up to doing 3 sets of this stretch, 3 times a day. Work up to holding the stretch for 30 to 60 seconds.  Note: Be sure to push your elbow across your chest, not up toward your chin. Over time, try to push your elbow farther across your chest to enhance the stretch.  Frozen shoulder  Frozen shoulder is another name for adhesive capsulitis, which causes restricted movement in the shoulder. If you have frozen shoulder, this stretch may cause discomfort, especially when you first get started. A few months may pass before you achieve the results  you want. Once your shoulder heals, it rarely becomes frozen again. So stick to your stretching program. If you have any questions, be sure to ask your doctor.   Date Last Reviewed: 8/16/2015  © 0834-7964 [employer]. [address] [place] [address]. All rights reserved. This information is not intended as a substitute for professional medical care. Always follow your healthcare professional's instructions.        Exercises for Shoulder Flexibility: Back Scratch    Improving your flexibility can reduce pain. Stretching exercises also can help increase your range of pain-free motion. Breathe normally when you exercise. Try to use smooth, fluid movements. Never force a stretch.  Note: Follow any special instructions you are given. If you feel pain, stop the exercise. If the pain continues after stopping, call your healthcare provider.  · Stand straight, placing the back of your hand on the side you want to stretch flat against your lower back.  · Throw one end of a towel over your shoulder. Grab it behind your back with your other hand.  · Pull down gently on the towel with your front arm. Let your back arm slide up as high as is comfortable. Youll feel a stretch in your shoulder. Hold the stretch for a few seconds.  · Repeat 3 to 5 times. Build up to holding each stretch for 30 to 60 seconds.  For your safety, check with your healthcare provider before starting an exercise program.   Date Last Reviewed: 8/26/2015  © 6027-8436 [employer]. [address] [place] [address]. All rights reserved. This information is not intended as a substitute for professional medical care. Always follow your healthcare professional's instructions.        Exercises for Shoulder Flexibility: Wall Walk    Improving your flexibility can reduce pain. Stretching exercises also can help increase your range of pain-free motion. Breathe normally when you exercise. Use smooth, fluid  movements.  Note: Follow any special instructions you are given. If you feel pain, stop the exercise. If the pain continues after stopping, call your healthcare provider:  · Stand with your shoulder about 2 feet from the wall.  · Raise your arm to shoulder level and gently walk your fingers up the wall as high as you can.  · Hold for a few seconds. Then walk your fingers back down.  · Repeat 3 times. Move closer to the wall as you repeat.  · Build up to holding each stretch for 30 seconds.  Caution: Do this stretch only if your healthcare provider recommends it. Dont do it when you are first injured.       Date Last Reviewed: 8/16/2015  © 1883-2032 [employer]. [address] [place] [address], [place] [address]. All rights reserved. This information is not intended as a substitute for professional medical care. Always follow your healthcare professional's instructions.

## 2020-07-08 NOTE — PATIENT INSTRUCTIONS
Exercises for Shoulder Flexibility: External Rotation    This stretch can help restore shoulder flexibility and relieve pain over time. When stretching, be sure to breathe deeply. Follow any special instructions from your doctor or physical therapist:  1.  a doorway. Grasp the doorjamb with the hand on the frozen side. Your arm should be bent.  2. With the other hand, hold the elbow on the frozen side firmly against your body.  3. Standing in the same spot, rotate your body away from the doorjamb. Stop when you feel the stretch in the shoulder. At first, try to hold the stretch for 5 seconds.  4. Work up to doing 3 sets of this stretch, 3 times a day. Work up to holding the stretch for 30 to 60 seconds.  Note: Keep your arms as still as you can. Over time, rotate your body a little more to enhance the stretch. But be careful not to twist your back.  Frozen shoulder  Frozen shoulder is another name for adhesive capsulitis, which causes restricted movement in the shoulder. If you have frozen shoulder, this stretch may cause discomfort, especially when you first get started. A few months may pass before you achieve the results you want. But once your shoulder heals, it rarely becomes frozen again. So stick to your stretching program. If you have any questions, be sure to ask your doctor.   Date Last Reviewed: 8/16/2015 © 2000-2017 [employer]. [address] [place] [address], [place] [address]. All rights reserved. This information is not intended as a substitute for professional medical care. Always follow your healthcare professional's instructions.        Exercises for Shoulder Flexibility: Internal Rotation    This stretch can help restore shoulder flexibility and relieve pain over time. When stretching, be sure to breathe deeply. Follow any special instructions from your healthcare provider or physical therapist.  5. While seated, move the arm on the side you want to stretch toward the middle of  your back. The palm of your hand should face out.  6. Cup your other hand under the hand thats behind your back. Gently push your cupped hand upward until you feel the stretch in the shoulder. Try to hold the stretch for 5 seconds.  7. Work up to doing 3 sets of this stretch, 3 times a day. Work up to holding the stretch for 30 to 60 seconds.  Note: Keep your back straight. Its OK if your hand cant reach the middle of your back. Instead, start the stretch with your hand as close as you can get it to the middle of your back.     Frozen shoulder  Frozen shoulder is another name for adhesive capsulitis. This causes restricted movement in the shoulder. If you have frozen shoulder, this stretch may cause discomfort, especially when you first get started. A few months may pass before you achieve the results you want. But once your shoulder heals, it rarely becomes frozen again. So stick to your stretching program. If you have any questions, be sure to ask your healthcare provider.   Date Last Reviewed: 10/14/2015  © 2249-2215 [employer]. [address] [place] [address]. All rights reserved. This information is not intended as a substitute for professional medical care. Always follow your healthcare professional's instructions.        Exercises for Shoulder Flexibility: Adduction (Reaching Across)    This stretch can help restore shoulder flexibility and relieve pain over time. When stretching, be sure to breathe deeply. And follow any special instructions from your doctor or physical therapist:  8. Put the hand from the side you want to stretch on your opposite shoulder. Your elbow should point away from your body. Try to raise your elbow as close to shoulder height as you can.  9. With your other hand, push the raised elbow toward the opposite shoulder. Avoid turning your head. Stop when you feel the stretch. Try to hold the stretch for 5 seconds.  10. Work up to doing 3 sets of this  stretch, 3 times a day. Work up to holding the stretch for 30 to 60 seconds.  Note: Be sure to push your elbow across your chest, not up toward your chin. Over time, try to push your elbow farther across your chest to enhance the stretch.  Frozen shoulder  Frozen shoulder is another name for adhesive capsulitis, which causes restricted movement in the shoulder. If you have frozen shoulder, this stretch may cause discomfort, especially when you first get started. A few months may pass before you achieve the results you want. Once your shoulder heals, it rarely becomes frozen again. So stick to your stretching program. If you have any questions, be sure to ask your doctor.   Date Last Reviewed: 8/16/2015  © 4620-7589 [employer]. [address] [place] [address], [place] [address]. All rights reserved. This information is not intended as a substitute for professional medical care. Always follow your healthcare professional's instructions.        Exercises for Shoulder Flexibility: Back Scratch    Improving your flexibility can reduce pain. Stretching exercises also can help increase your range of pain-free motion. Breathe normally when you exercise. Try to use smooth, fluid movements. Never force a stretch.  Note: Follow any special instructions you are given. If you feel pain, stop the exercise. If the pain continues after stopping, call your healthcare provider.  · Stand straight, placing the back of your hand on the side you want to stretch flat against your lower back.  · Throw one end of a towel over your shoulder. Grab it behind your back with your other hand.  · Pull down gently on the towel with your front arm. Let your back arm slide up as high as is comfortable. Youll feel a stretch in your shoulder. Hold the stretch for a few seconds.  · Repeat 3 to 5 times. Build up to holding each stretch for 30 to 60 seconds.  For your safety, check with your healthcare provider before starting an exercise program.    Date Last Reviewed: 8/26/2015  © 0308-3215 [employer]. [address] [place] [address]. All rights reserved. This information is not intended as a substitute for professional medical care. Always follow your healthcare professional's instructions.        Exercises for Shoulder Flexibility: Wall Walk    Improving your flexibility can reduce pain. Stretching exercises also can help increase your range of pain-free motion. Breathe normally when you exercise. Use smooth, fluid movements.  Note: Follow any special instructions you are given. If you feel pain, stop the exercise. If the pain continues after stopping, call your healthcare provider:  · Stand with your shoulder about 2 feet from the wall.  · Raise your arm to shoulder level and gently walk your fingers up the wall as high as you can.  · Hold for a few seconds. Then walk your fingers back down.  · Repeat 3 times. Move closer to the wall as you repeat.  · Build up to holding each stretch for 30 seconds.  Caution: Do this stretch only if your healthcare provider recommends it. Dont do it when you are first injured.       Date Last Reviewed: 8/16/2015  © 2739-9104 [employer]. [address] [place] [address]. All rights reserved. This information is not intended as a substitute for professional medical care. Always follow your healthcare professional's instructions.

## 2020-07-15 ENCOUNTER — OFFICE VISIT (OUTPATIENT)
Dept: URGENT CARE | Facility: CLINIC | Age: 45
End: 2020-07-15
Payer: COMMERCIAL

## 2020-07-15 DIAGNOSIS — S46.911A STRAIN OF RIGHT SHOULDER, INITIAL ENCOUNTER: Primary | ICD-10-CM
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
## 2020-07-15 PROCEDURE — 20610 LARGE JOINT ASPIRATION/INJECTION: R GLENOHUMERAL: ICD-10-PCS | Mod: RT,S$GLB,, | Performed by: PHYSICIAN ASSISTANT

## 2020-07-15 PROCEDURE — 20610 DRAIN/INJ JOINT/BURSA W/O US: CPT | Mod: RT,S$GLB,, | Performed by: PHYSICIAN ASSISTANT

## 2020-07-15 PROCEDURE — 99214 PR OFFICE/OUTPT VISIT, EST, LEVL IV, 30-39 MIN: ICD-10-PCS | Mod: 25,S$GLB,, | Performed by: PHYSICIAN ASSISTANT

## 2020-07-15 PROCEDURE — 99214 OFFICE O/P EST MOD 30 MIN: CPT | Mod: 25,S$GLB,, | Performed by: PHYSICIAN ASSISTANT

## 2020-07-15 RX ORDER — BETAMETHASONE SODIUM PHOSPHATE AND BETAMETHASONE ACETATE 3; 3 MG/ML; MG/ML
6 INJECTION, SUSPENSION INTRA-ARTICULAR; INTRALESIONAL; INTRAMUSCULAR; SOFT TISSUE
Status: DISCONTINUED | OUTPATIENT
Start: 2020-07-15 | End: 2020-07-15 | Stop reason: HOSPADM

## 2020-07-15 RX ADMIN — BETAMETHASONE SODIUM PHOSPHATE AND BETAMETHASONE ACETATE 6 MG: 3; 3 INJECTION, SUSPENSION INTRA-ARTICULAR; INTRALESIONAL; INTRAMUSCULAR; SOFT TISSUE at 10:07
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
## 2020-07-15 NOTE — PROCEDURES
Large Joint Aspiration/Injection: R glenohumeral    Date/Time: 7/15/2020 10:30 AM  Performed by: Óscar Guzmán PA-C  Authorized by: Óscar Guzmán PA-C     Consent Done?:  Yes (Verbal)  Indications:  Pain  Site marked: the procedure site was marked    Timeout: prior to procedure the correct patient, procedure, and site was verified    Prep: patient was prepped and draped in usual sterile fashion      Local anesthesia used?: Yes    Anesthesia:  Local infiltration  Local anesthetic:  Lidocaine 1% without epinephrine  Anesthetic total (ml):  1.5      Details:  Needle Size:  25 G  Approach:  Anterior  Location:  Shoulder  Site:  R glenohumeral  Medications:  6 mg betamethasone acetate-betamethasone sodium phosphate 6 mg/mL  Patient tolerance:  Patient tolerated the procedure well with no immediate complications     2ml 1% lidocaine / 2ml 0.25% bupivocaine / 1ml celestone mixture injected into right glenohumeral joint.

## 2020-07-15 NOTE — LETTER
[facility]  [address]  [place] [address]  Phone: [phone]  Fax: [phone]  [facility]: [id number]    Pt Name: Jane Carrasquillo  Injury Date: 07/06/2020   Employee ID: [id number] Date of Treatment: 07/15/2020   Company: [employer]      Appointment Time: 10:15 AM Arrived: 10:17 a.m.   Provider: Óscar Guzmán PA-C Time Out: 11:49 a.m.     Office Treatment:   1. Strain of right shoulder, initial encounter    2. Work related injury          Patient Instructions: Daily home exercises/warm soaks, Attention not to aggravate affected area(Continue ibuprofen 600 mg 4 times daily as needed for pain.)    Restrictions: Regular Duty     Return Appointment: 7/22/2020 at 9:30 a.m.  [place]

## 2020-07-22 ENCOUNTER — OFFICE VISIT (OUTPATIENT)
Dept: URGENT CARE | Facility: CLINIC | Age: 45
End: 2020-07-22
Payer: COMMERCIAL

## 2020-07-22 DIAGNOSIS — S46.911D STRAIN OF RIGHT SHOULDER, SUBSEQUENT ENCOUNTER: Primary | ICD-10-CM
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
## 2020-07-22 PROCEDURE — 99214 OFFICE O/P EST MOD 30 MIN: CPT | Mod: S$GLB,,, | Performed by: PHYSICIAN ASSISTANT

## 2020-07-22 PROCEDURE — 99214 PR OFFICE/OUTPT VISIT, EST, LEVL IV, 30-39 MIN: ICD-10-PCS | Mod: S$GLB,,, | Performed by: PHYSICIAN ASSISTANT

## 2020-07-22 NOTE — PROGRESS NOTES
Subjective:       Patient ID: Jane Carrasquillo is a 45 y.o. female.    Chief Complaint: Shoulder Injury (Right)    RV, Right shoulder (DOI 7/6/2020) [place]- Patient states her shoulder feels a lot better and only has minor discomfort and burning sensation. Pain level 1/10 on today. She is currently not taking any medication for shoulder. [place]    Shoulder Injury   The incident occurred at work. The right shoulder is affected. The incident occurred more than 1 week ago. The injury mechanism was repetitive motion and overhead work. The quality of the pain is described as aching and burning. The pain does not radiate. The pain is at a severity of 1/10. The pain is mild. Pertinent negatives include no chest pain, muscle weakness, numbness or tingling. Nothing aggravates the symptoms. She has tried heat and ice for the symptoms. The treatment provided moderate relief.       Constitution: Negative for fatigue.   HENT: Negative for facial swelling and facial trauma.    Neck: Negative for neck stiffness.   Cardiovascular: Negative for chest trauma and chest pain.   Eyes: Negative for eye trauma, double vision and blurred vision.   Respiratory: Negative.    Gastrointestinal: Negative for abdominal trauma, abdominal pain and rectal bleeding.   Endocrine: negative.   Genitourinary: Negative for hematuria, missed menses, genital trauma and pelvic pain.   Musculoskeletal: Positive for pain and joint pain. Negative for trauma, joint swelling and abnormal ROM of joint.   Skin: Negative for color change, wound, abrasion, laceration and bruising.   Allergic/Immunologic: Negative.    Neurological: Negative for dizziness, history of vertigo, light-headedness, coordination disturbances, altered mental status, loss of consciousness and numbness.   Hematologic/Lymphatic: Negative for history of bleeding disorder.   Psychiatric/Behavioral: Negative for altered mental status.        Objective:      Physical Exam  Nursing note reviewed.    Constitutional:       General: She is not in acute distress.     Appearance: She is well-developed. She is not diaphoretic.   HENT:      Head: Normocephalic and atraumatic.      Right Ear: Hearing and external ear normal.      Left Ear: Hearing and external ear normal.      Nose: Nose normal. No nasal deformity.   Eyes:      General: Lids are normal. No scleral icterus.     Conjunctiva/sclera: Conjunctivae normal.   Neck:      Musculoskeletal: Normal range of motion.      Trachea: Trachea normal.   Cardiovascular:      Pulses: Normal pulses.           Radial pulses are 2+ on the right side and 2+ on the left side.   Pulmonary:      Effort: Pulmonary effort is normal. No respiratory distress.   Musculoskeletal:      Right shoulder: She exhibits tenderness (anterior aspect). She exhibits normal range of motion, no swelling, no deformity, no pain (O'Thomas, Amber, Lift off, Neff, Neer negative), normal pulse and normal strength.      Comments: Right shoulder 6 mm patch of ecchymosis at injection site.   Skin:     General: Skin is warm and dry.      Capillary Refill: Capillary refill takes less than 2 seconds.   Neurological:      Mental Status: She is alert. She is not disoriented.      GCS: GCS eye subscore is 4. GCS verbal subscore is 5. GCS motor subscore is 6.      Sensory: No sensory deficit.   Psychiatric:         Attention and Perception: She is attentive.         Speech: Speech normal.         Behavior: Behavior normal.         Assessment:       1. Strain of right shoulder, subsequent encounter    2. Work related injury        Plan:            Patient Instructions: Daily home exercises/warm soaks(Ibuprofen 600mg every 6 hours as needed for pain.)   Restrictions: Regular Duty, Discharged from Occupational Health  Follow up if symptoms worsen or fail to improve.

## 2020-07-22 NOTE — LETTER
[facility]  [address]  [place] [address]  Phone: [phone]  Fax: [phone]  [facility]: [id number]    Pt Name: Jane Carrasquillo  Injury Date: 07/06/2020   Employee ID: [id number] Date of  Treatment: 07/22/2020   Company: [employer]      Appointment Time: 09:15 AM Arrived: 10:00 a.m.   Provider: Óscar Guzmán PA-C Time Out: 10:54 a.m.     Office Treatment:   1. Strain of right shoulder, subsequent encounter    2. Work related injury          Patient Instructions: Daily home exercises/warm soaks(Ibuprofen 600mg every 6 hours as needed for pain.)    Restrictions: Regular Duty, Discharged from Occupational Health     Return Appointment: Discharged from Occupational Health  [place]

## 2020-12-26 ENCOUNTER — HOSPITAL ENCOUNTER (EMERGENCY)
Facility: HOSPITAL | Age: 45
Discharge: HOME | End: 2020-12-26
Attending: EMERGENCY MEDICINE
Payer: COMMERCIAL

## 2020-12-26 VITALS
BODY MASS INDEX: 33.75 KG/M2 | TEMPERATURE: 98 F | HEART RATE: 69 BPM | SYSTOLIC BLOOD PRESSURE: 120 MMHG | RESPIRATION RATE: 16 BRPM | HEIGHT: 66 IN | DIASTOLIC BLOOD PRESSURE: 79 MMHG | OXYGEN SATURATION: 98 % | WEIGHT: 210 LBS

## 2020-12-26 DIAGNOSIS — M79.18 MUSCULOSKELETAL PAIN: ICD-10-CM
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
## 2020-12-26 DIAGNOSIS — M54.50 LUMBAR BACK PAIN: Primary | ICD-10-CM

## 2020-12-26 LAB — B-HCG UR QL: NEGATIVE

## 2020-12-26 PROCEDURE — 99284 EMERGENCY DEPT VISIT MOD MDM: CPT | Mod: 25,ER

## 2020-12-26 PROCEDURE — 96372 THER/PROPH/DIAG INJ SC/IM: CPT | Mod: 59,ER

## 2020-12-26 PROCEDURE — 81025 URINE PREGNANCY TEST: CPT | Mod: ER

## 2020-12-26 PROCEDURE — 63600175 PHARM REV CODE 636 W HCPCS: Mod: ER | Performed by: EMERGENCY MEDICINE

## 2020-12-26 RX ORDER — NAPROXEN 500 MG/1
500 TABLET ORAL 2 TIMES DAILY PRN
Qty: 14 TABLET | Refills: 0 | Status: SHIPPED | OUTPATIENT
Start: 2020-12-26

## 2020-12-26 RX ORDER — KETOROLAC TROMETHAMINE 30 MG/ML
15 INJECTION, SOLUTION INTRAMUSCULAR; INTRAVENOUS
Status: COMPLETED | OUTPATIENT
Start: 2020-12-26 | End: 2020-12-26

## 2020-12-26 RX ORDER — METHOCARBAMOL 750 MG/1
750 TABLET, FILM COATED ORAL 3 TIMES DAILY PRN
Qty: 30 TABLET | Refills: 0 | Status: SHIPPED | OUTPATIENT
Start: 2020-12-26

## 2020-12-26 RX ADMIN — KETOROLAC TROMETHAMINE 15 MG: 30 INJECTION, SOLUTION INTRAMUSCULAR at 09:12

## 2020-12-27 NOTE — ED NOTES
RT at bedside to perform EKG, per MD hold EKG. Verbalized understanding. Will continue to monitor.
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
## 2022-01-03 ENCOUNTER — IMMUNIZATION (OUTPATIENT)
Dept: PRIMARY CARE CLINIC | Facility: CLINIC | Age: 47
End: 2022-01-03
Payer: COMMERCIAL

## 2022-01-03 DIAGNOSIS — Z23 NEED FOR VACCINATION: Primary | ICD-10-CM

## 2022-01-03 PROCEDURE — 0004A COVID-19, MRNA, LNP-S, PF, 30 MCG/0.3 ML DOSE VACCINE: CPT | Mod: CV19,PBBFAC | Performed by: INTERNAL MEDICINE
